# Patient Record
Sex: MALE | URBAN - METROPOLITAN AREA
[De-identification: names, ages, dates, MRNs, and addresses within clinical notes are randomized per-mention and may not be internally consistent; named-entity substitution may affect disease eponyms.]

---

## 2020-05-27 ENCOUNTER — APPOINTMENT (RX ONLY)
Dept: URBAN - METROPOLITAN AREA CLINIC 22 | Facility: CLINIC | Age: 45
Setting detail: DERMATOLOGY
End: 2020-05-27

## 2020-05-27 DIAGNOSIS — L81.4 OTHER MELANIN HYPERPIGMENTATION: ICD-10-CM

## 2020-05-27 DIAGNOSIS — Z71.89 OTHER SPECIFIED COUNSELING: ICD-10-CM

## 2020-05-27 DIAGNOSIS — D22 MELANOCYTIC NEVI: ICD-10-CM

## 2020-05-27 DIAGNOSIS — D18.0 HEMANGIOMA: ICD-10-CM

## 2020-05-27 DIAGNOSIS — L82.1 OTHER SEBORRHEIC KERATOSIS: ICD-10-CM

## 2020-05-27 PROBLEM — D22.5 MELANOCYTIC NEVI OF TRUNK: Status: ACTIVE | Noted: 2020-05-27

## 2020-05-27 PROBLEM — D18.01 HEMANGIOMA OF SKIN AND SUBCUTANEOUS TISSUE: Status: ACTIVE | Noted: 2020-05-27

## 2020-05-27 PROCEDURE — 99203 OFFICE O/P NEW LOW 30 MIN: CPT

## 2020-05-27 PROCEDURE — ? COUNSELING

## 2020-05-27 ASSESSMENT — LOCATION ZONE DERM: LOCATION ZONE: TRUNK

## 2020-05-27 ASSESSMENT — LOCATION SIMPLE DESCRIPTION DERM
LOCATION SIMPLE: LEFT UPPER BACK
LOCATION SIMPLE: RIGHT LOWER BACK
LOCATION SIMPLE: ABDOMEN

## 2020-05-27 ASSESSMENT — LOCATION DETAILED DESCRIPTION DERM
LOCATION DETAILED: RIGHT SUPERIOR MEDIAL MIDBACK
LOCATION DETAILED: EPIGASTRIC SKIN
LOCATION DETAILED: LEFT SUPERIOR MEDIAL UPPER BACK

## 2021-03-24 ENCOUNTER — HOSPITAL ENCOUNTER (OUTPATIENT)
Dept: LAB | Facility: MEDICAL CENTER | Age: 46
End: 2021-03-24
Attending: ANESTHESIOLOGY
Payer: COMMERCIAL

## 2021-03-24 LAB
COVID ORDER STATUS COVID19: NORMAL
SARS-COV-2 RNA RESP QL NAA+PROBE: NOTDETECTED
SPECIMEN SOURCE: NORMAL

## 2021-03-24 PROCEDURE — U0005 INFEC AGEN DETEC AMPLI PROBE: HCPCS

## 2021-03-24 PROCEDURE — C9803 HOPD COVID-19 SPEC COLLECT: HCPCS

## 2021-03-24 PROCEDURE — U0003 INFECTIOUS AGENT DETECTION BY NUCLEIC ACID (DNA OR RNA); SEVERE ACUTE RESPIRATORY SYNDROME CORONAVIRUS 2 (SARS-COV-2) (CORONAVIRUS DISEASE [COVID-19]), AMPLIFIED PROBE TECHNIQUE, MAKING USE OF HIGH THROUGHPUT TECHNOLOGIES AS DESCRIBED BY CMS-2020-01-R: HCPCS

## 2022-02-16 ENCOUNTER — HOSPITAL ENCOUNTER (EMERGENCY)
Facility: MEDICAL CENTER | Age: 47
End: 2022-02-16
Attending: EMERGENCY MEDICINE
Payer: COMMERCIAL

## 2022-02-16 VITALS
TEMPERATURE: 98.9 F | RESPIRATION RATE: 16 BRPM | WEIGHT: 165 LBS | HEART RATE: 75 BPM | SYSTOLIC BLOOD PRESSURE: 117 MMHG | HEIGHT: 70 IN | DIASTOLIC BLOOD PRESSURE: 74 MMHG | BODY MASS INDEX: 23.62 KG/M2 | OXYGEN SATURATION: 95 %

## 2022-02-16 DIAGNOSIS — I47.10 SVT (SUPRAVENTRICULAR TACHYCARDIA) (HCC): ICD-10-CM

## 2022-02-16 LAB
ALBUMIN SERPL BCP-MCNC: 5 G/DL (ref 3.2–4.9)
ALBUMIN/GLOB SERPL: 2.2 G/DL
ALP SERPL-CCNC: 51 U/L (ref 30–99)
ALT SERPL-CCNC: 26 U/L (ref 2–50)
ANION GAP SERPL CALC-SCNC: 12 MMOL/L (ref 7–16)
AST SERPL-CCNC: 34 U/L (ref 12–45)
BASOPHILS # BLD AUTO: 0.5 % (ref 0–1.8)
BASOPHILS # BLD: 0.04 K/UL (ref 0–0.12)
BILIRUB SERPL-MCNC: 0.6 MG/DL (ref 0.1–1.5)
BUN SERPL-MCNC: 20 MG/DL (ref 8–22)
CALCIUM SERPL-MCNC: 9.3 MG/DL (ref 8.5–10.5)
CHLORIDE SERPL-SCNC: 107 MMOL/L (ref 96–112)
CO2 SERPL-SCNC: 23 MMOL/L (ref 20–33)
CREAT SERPL-MCNC: 1.33 MG/DL (ref 0.5–1.4)
EKG IMPRESSION: NORMAL
EOSINOPHIL # BLD AUTO: 0.06 K/UL (ref 0–0.51)
EOSINOPHIL NFR BLD: 0.7 % (ref 0–6.9)
ERYTHROCYTE [DISTWIDTH] IN BLOOD BY AUTOMATED COUNT: 38.3 FL (ref 35.9–50)
GLOBULIN SER CALC-MCNC: 2.3 G/DL (ref 1.9–3.5)
GLUCOSE SERPL-MCNC: 94 MG/DL (ref 65–99)
HCT VFR BLD AUTO: 45.7 % (ref 42–52)
HGB BLD-MCNC: 16.3 G/DL (ref 14–18)
IMM GRANULOCYTES # BLD AUTO: 0.03 K/UL (ref 0–0.11)
IMM GRANULOCYTES NFR BLD AUTO: 0.3 % (ref 0–0.9)
LYMPHOCYTES # BLD AUTO: 1.28 K/UL (ref 1–4.8)
LYMPHOCYTES NFR BLD: 14.6 % (ref 22–41)
MCH RBC QN AUTO: 32.2 PG (ref 27–33)
MCHC RBC AUTO-ENTMCNC: 35.7 G/DL (ref 33.7–35.3)
MCV RBC AUTO: 90.3 FL (ref 81.4–97.8)
MONOCYTES # BLD AUTO: 0.51 K/UL (ref 0–0.85)
MONOCYTES NFR BLD AUTO: 5.8 % (ref 0–13.4)
NEUTROPHILS # BLD AUTO: 6.85 K/UL (ref 1.82–7.42)
NEUTROPHILS NFR BLD: 78.1 % (ref 44–72)
NRBC # BLD AUTO: 0 K/UL
NRBC BLD-RTO: 0 /100 WBC
PLATELET # BLD AUTO: 239 K/UL (ref 164–446)
PMV BLD AUTO: 10.4 FL (ref 9–12.9)
POTASSIUM SERPL-SCNC: 4.5 MMOL/L (ref 3.6–5.5)
PROT SERPL-MCNC: 7.3 G/DL (ref 6–8.2)
RBC # BLD AUTO: 5.06 M/UL (ref 4.7–6.1)
SODIUM SERPL-SCNC: 142 MMOL/L (ref 135–145)
TSH SERPL DL<=0.005 MIU/L-ACNC: 2.18 UIU/ML (ref 0.38–5.33)
WBC # BLD AUTO: 8.8 K/UL (ref 4.8–10.8)

## 2022-02-16 PROCEDURE — 84443 ASSAY THYROID STIM HORMONE: CPT

## 2022-02-16 PROCEDURE — 93005 ELECTROCARDIOGRAM TRACING: CPT | Performed by: EMERGENCY MEDICINE

## 2022-02-16 PROCEDURE — 93005 ELECTROCARDIOGRAM TRACING: CPT

## 2022-02-16 PROCEDURE — 36415 COLL VENOUS BLD VENIPUNCTURE: CPT

## 2022-02-16 PROCEDURE — 85025 COMPLETE CBC W/AUTO DIFF WBC: CPT

## 2022-02-16 PROCEDURE — 80053 COMPREHEN METABOLIC PANEL: CPT

## 2022-02-16 PROCEDURE — 99283 EMERGENCY DEPT VISIT LOW MDM: CPT

## 2022-02-17 NOTE — ED PROVIDER NOTES
ED Provider Note    CHIEF COMPLAINT  Chief Complaint   Patient presents with   • Supraventricular Tachycardia (SVT)     Pt found to be in SVT pta. EMS states they started an IV to give adenosine and pt converted to SR on his own        HPI  Pipo Hinson is a 46 y.o. male who presents for evaluation of palpitations and an episode of SVT.  The patient apparently has a history of supraventricular tachycardia.  This was diagnosed when he lived in [place].  He had been seen by cardiologist.  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  He specifically denies any stimulant drug abuse extra caffeine or any other instigating factor.  He was working from home on a [employer] meeting and felt queasiness and his pulse was around 1 .  Paramedics arrived and confirmed what apparently was supraventricular tachycardia.  They are in the process of starting an IV and likely due to that stimulation the patient converted to sinus rhythm.  He is currently not on any medications.  No other symptoms reported.    REVIEW OF SYSTEMS  See HPI for further details.  No chest pain numbness tingling weakness fevers chills all other systems are negative.     PAST MEDICAL HISTORY  Past Medical History:   Diagnosis Date   • SVT (supraventricular tachycardia) (HCC)        FAMILY HISTORY  No history of sudden cardiac death    SOCIAL HISTORY  Social History     Socioeconomic History   • Marital status: Single   Tobacco Use   • Smoking status: Never Smoker   • Smokeless tobacco: Never Used   Vaping Use   • Vaping Use: Never used   Substance and Sexual Activity   • Alcohol use: Never   • Drug use: Never     Denies drug or alcohol abuse  SURGICAL HISTORY  No past surgical history on file.  No major surgeries  CURRENT MEDICATIONS  Home Medications     Reviewed by Malathi Rashid R.N. (Registered Nurse) on 02/16/22 at 1622  Med List Status: Complete   Medication Last  "Dose Status        Patient Osmany Taking any Medications                       ALLERGIES  No Known Allergies    PHYSICAL EXAM  VITAL SIGNS: /74   Pulse 75   Temp 37.2 °C (98.9 °F) (Temporal)   Resp 16   Ht 1.778 m (5' 10\")   Wt 74.8 kg (165 lb)   SpO2 95%   BMI 23.68 kg/m²       Constitutional: Well developed, Well nourished, No acute distress, Non-toxic appearance.   HENT: Normocephalic, Atraumatic, Bilateral external ears normal, Oropharynx moist, No oral exudates, Nose normal.   Eyes: PERRLA, EOMI, Conjunctiva normal, No discharge.   Neck: Normal range of motion, No tenderness, Supple, No stridor.   Cardiovascular: Normal heart rate, Normal rhythm, No murmurs, No rubs, No gallops.   Thorax & Lungs: Normal breath sounds, No respiratory distress, No wheezing, No chest tenderness.   Abdomen: Bowel sounds normal, Soft, No tenderness, No masses, No pulsatile masses.   Skin: Warm, Dry, No erythema, No rash.   Extremities: Intact distal pulses, No edema, No tenderness, No cyanosis, No clubbing.    Neurologic: Alert & oriented x 3, Normal motor function, Normal sensory function, No focal deficits noted.   Psychiatric: Anxious  Results for orders placed or performed during the hospital encounter of 02/16/22   CBC WITH DIFFERENTIAL   Result Value Ref Range    WBC 8.8 4.8 - 10.8 K/uL    RBC 5.06 4.70 - 6.10 M/uL    Hemoglobin 16.3 14.0 - 18.0 g/dL    Hematocrit 45.7 42.0 - 52.0 %    MCV 90.3 81.4 - 97.8 fL    MCH 32.2 27.0 - 33.0 pg    MCHC 35.7 (H) 33.7 - 35.3 g/dL    RDW 38.3 35.9 - 50.0 fL    Platelet Count 239 164 - 446 K/uL    MPV 10.4 9.0 - 12.9 fL    Neutrophils-Polys 78.10 (H) 44.00 - 72.00 %    Lymphocytes 14.60 (L) 22.00 - 41.00 %    Monocytes 5.80 0.00 - 13.40 %    Eosinophils 0.70 0.00 - 6.90 %    Basophils 0.50 0.00 - 1.80 %    Immature Granulocytes 0.30 0.00 - 0.90 %    Nucleated RBC 0.00 /100 WBC    Neutrophils (Absolute) 6.85 1.82 - 7.42 K/uL    Lymphs (Absolute) 1.28 1.00 - 4.80 K/uL    Monos " (Absolute) 0.51 0.00 - 0.85 K/uL    Eos (Absolute) 0.06 0.00 - 0.51 K/uL    Baso (Absolute) 0.04 0.00 - 0.12 K/uL    Immature Granulocytes (abs) 0.03 0.00 - 0.11 K/uL    NRBC (Absolute) 0.00 K/uL   Comp Metabolic Panel   Result Value Ref Range    Sodium 142 135 - 145 mmol/L    Potassium 4.5 3.6 - 5.5 mmol/L    Chloride 107 96 - 112 mmol/L    Co2 23 20 - 33 mmol/L    Anion Gap 12.0 7.0 - 16.0    Glucose 94 65 - 99 mg/dL    Bun 20 8 - 22 mg/dL    Creatinine 1.33 0.50 - 1.40 mg/dL    Calcium 9.3 8.5 - 10.5 mg/dL    AST(SGOT) 34 12 - 45 U/L    ALT(SGPT) 26 2 - 50 U/L    Alkaline Phosphatase 51 30 - 99 U/L    Total Bilirubin 0.6 0.1 - 1.5 mg/dL    Albumin 5.0 (H) 3.2 - 4.9 g/dL    Total Protein 7.3 6.0 - 8.2 g/dL    Globulin 2.3 1.9 - 3.5 g/dL    A-G Ratio 2.2 g/dL   ESTIMATED GFR   Result Value Ref Range    GFR If African American >60 >60 mL/min/1.73 m 2    GFR If Non African American 58 (A) >60 mL/min/1.73 m 2   EKG (NOW)   Result Value Ref Range    Report       [facility] Emergency Dept.    Test Date:  2022  Pt Name:    MARCELINA MARTINEZ             Department: ER  MRN:        [id number]                      Room:  Gender:     Male                         Technician: [id number]  :        1975                   Requested By:ER TRIAGE PROTOCOL  Order #:    [id number]                    Reading MD:    Measurements  Intervals                                Axis  Rate:       68                           P:          69  RI:         188                          QRS:        -31  QRSD:       92                           T:          26  QT:         364  QTc:        388    Interpretive Statements  SINUS RHYTHM  LEFT AXIS DEVIATION  No previous ECG available for comparison           COURSE & MEDICAL DECISION MAKING  Pertinent Labs & Imaging studies reviewed. (See chart for details)  The patient presented here after an episode of supraventricular tachycardia in the field.  He spontaneously converted  without any obvious vagal maneuvers or adenosine.  Further history taking reveals that patient has been dealing this for quite a while.  He had seen a cardiologist in [place].  He was observed for around 2 hours and did not have any recurrence of tachydysrhythmia.  Laboratory studies including CBC metabolic panel are unremarkable.  He never had any chest pain or strokelike symptoms.  I added on thyroid studies for follow-up to cardiology which they can assess.  Patient declined low-dose beta-blocker therapy due to possible side effects I think that that is reasonable at this time.  We will provide urgent referral to cardiology to discuss medication and/or ablation    FINAL IMPRESSION  1.  Supraventricular tachycardia         Electronically signed by: Swapnil Niño M.D., 2/16/2022 4:38 PM

## 2022-02-17 NOTE — ED TRIAGE NOTES
Pt to triage .  Chief Complaint   Patient presents with   • Supraventricular Tachycardia (SVT)     Pt found to be in SVT pta. EMS states they started an IV to give adenosine and pt converted to SR on his own

## 2022-02-18 ENCOUNTER — TELEPHONE (OUTPATIENT)
Dept: CARDIOLOGY | Facility: MEDICAL CENTER | Age: 47
End: 2022-02-18
Payer: COMMERCIAL

## 2022-02-18 NOTE — TELEPHONE ENCOUNTER
"Called patient in regards to upcoming appointment scheduled on 02/24/2022 with . Per ER note, patient has been seen by a cardiologist in [place]. LVM for patient to please CB with this information to obtain records before his visit.     *Line went straight to voicemail, will try calling again at another time.\"     "

## 2022-02-18 NOTE — TELEPHONE ENCOUNTER
Medical records have been requested from [facility]: Rolan Pruitt's office. Fax: [phone], Phone: [phone].    Requested all cardiac related records, EKG tracings, OV notes, JACQU[facility]IN procedural notes.     Fax confirmation has been received and sent to scan through [employer].

## 2022-02-18 NOTE — TELEPHONE ENCOUNTER
Pt called back stating prior cardio Dr. Pruitt with [facility] in [place], [place]. He does have a CD that says TTE on it if that is needed.

## 2022-02-22 NOTE — TELEPHONE ENCOUNTER
Called [facility] to check status of medical records request. Stated fax number was incorrect and needs to be sent to another fax number: [phone], Phone: [phone].    Fax confirmation has been received and sent to scan through [employer].

## 2022-02-24 ENCOUNTER — OFFICE VISIT (OUTPATIENT)
Dept: CARDIOLOGY | Facility: MEDICAL CENTER | Age: 47
End: 2022-02-24
Attending: EMERGENCY MEDICINE
Payer: COMMERCIAL

## 2022-02-24 VITALS
RESPIRATION RATE: 13 BRPM | SYSTOLIC BLOOD PRESSURE: 118 MMHG | OXYGEN SATURATION: 99 % | WEIGHT: 166 LBS | HEART RATE: 41 BPM | HEIGHT: 70 IN | BODY MASS INDEX: 23.77 KG/M2 | DIASTOLIC BLOOD PRESSURE: 64 MMHG

## 2022-02-24 DIAGNOSIS — I47.10 SVT (SUPRAVENTRICULAR TACHYCARDIA) (HCC): ICD-10-CM

## 2022-02-24 LAB — EKG IMPRESSION: NORMAL

## 2022-02-24 PROCEDURE — 99205 OFFICE O/P NEW HI 60 MIN: CPT | Mod: 25 | Performed by: INTERNAL MEDICINE

## 2022-02-24 PROCEDURE — 93000 ELECTROCARDIOGRAM COMPLETE: CPT | Performed by: INTERNAL MEDICINE

## 2022-02-24 RX ORDER — PROPRANOLOL HYDROCHLORIDE 10 MG/1
10 TABLET ORAL 2 TIMES DAILY PRN
Qty: 60 TABLET | Refills: 0 | Status: SHIPPED | OUTPATIENT
Start: 2022-02-24 | End: 2023-01-13 | Stop reason: SDUPTHER

## 2022-02-24 ASSESSMENT — FIBROSIS 4 INDEX: FIB4 SCORE: 1.31

## 2022-02-24 NOTE — PROGRESS NOTES
Arrhythmia Clinic Note (New patient)     DOS: 2/24/2022    Referring physician: Dr Niño    Chief complaint/Reason for consult: SVT    HPI: 45 y/o M with longstanding history of pSVT. His mother has pAF. He has seen cardiology and EP before in [facility]. He has had episodes in the past of sustained SVT requiring adenosine for termination. Recently he had a similar sustained episode and EMS placed IV in the field and this converted pt to NSR. He is not on any medications due to low resting heart rate. He is anxious about catheter ablation and previously refused this.    ROS (+ highlighted in bold):  Constitutional: Fevers/chills/fatigue/weightloss  HEENT: Blurry vision/eye pain/sore throat/hearing loss  Respiratory: Shortness of breath/cough  Cardiovascular: Chest pain/palpitations/edema/orthopnea/syncope  GI: Nausea/vomitting/diarrhea  MSK: Arthralgias/myagias/muscle weakness  Skin: Rash/sores  Neurological: Numbness/tremors/vertigo  Endocrine: Excessive thirst/polyuria/cold intolerance/heat intolerance  Psych: Depression/anxiety    Past Medical History:   Diagnosis Date   • SVT (supraventricular tachycardia) (HCC)        No past surgical history on file.    Social History     Socioeconomic History   • Marital status: Single     Spouse name: Not on file   • Number of children: Not on file   • Years of education: Not on file   • Highest education level: Not on file   Occupational History   • Not on file   Tobacco Use   • Smoking status: Never Smoker   • Smokeless tobacco: Never Used   Vaping Use   • Vaping Use: Never used   Substance and Sexual Activity   • Alcohol use: Never   • Drug use: Never   • Sexual activity: Not on file   Other Topics Concern   • Not on file   Social History Narrative   • Not on file     Social Determinants of Health     Financial Resource Strain: Not on file   Food Insecurity: Not on file   Transportation Needs: Not on file   Physical Activity: Not on file   Stress: Not on file   Social  "Connections: Not on file   Intimate Partner Violence: Not on file   Housing Stability: Not on file       No family history on file.   Mother has PAF    No Known Allergies    Current Outpatient Medications   Medication Sig Dispense Refill   • propranolol (INDERAL) 10 MG Tab Take 1 Tablet by mouth 2 times a day as needed. 60 Tablet 0     No current facility-administered medications for this visit.       Physical Exam:  Vitals:    02/24/22 0739   BP: 118/64   BP Location: Left arm   Patient Position: Sitting   BP Cuff Size: Adult   Pulse: (!) 41   Resp: 13   SpO2: 99%   Weight: 75.3 kg (166 lb)   Height: 1.778 m (5' 10\")     General appearance: NAD, conversant   Eyes: anicteric sclerae, moist conjunctivae; no lid-lag; PERRLA  HENT: Atraumatic; oropharynx clear with moist mucous membranes and no mucosal ulcerations; normal hard and soft palate  Neck: Trachea midline; FROM, supple, no thyromegaly or lymphadenopathy  Lungs: CTA, with normal respiratory effort and no intercostal retractions  CV: RRR, no MRGs, no JVD   Abdomen: Soft, non-tender; no masses or HSM  Extremities: No peripheral edema or extremity lymphadenopathy  Skin: Normal temperature, turgor and texture; no rash, ulcers or subcutaneous nodules  Psych: Appropriate affect, alert and oriented to person, place and time    Data:  Lipids:   No results found for: CHOLSTRLTOT, TRIGLYCERIDE, HDL, LDL     BMP:  Lab Results   Component Value Date/Time    SODIUM 142 02/16/2022 1705    POTASSIUM 4.5 02/16/2022 1705    CHLORIDE 107 02/16/2022 1705    CO2 23 02/16/2022 1705    GLUCOSE 94 02/16/2022 1705    BUN 20 02/16/2022 1705    CREATININE 1.33 02/16/2022 1705    CALCIUM 9.3 02/16/2022 1705    ANION 12.0 02/16/2022 1705        TSH:   Lab Results   Component Value Date/Time    TSHULTRASEN 2.180 02/16/2022 1705        THYROXINE (T4):   No results found for: HSAMIKA     CBC:   Lab Results   Component Value Date/Time    WBC 8.8 02/16/2022 05:05 PM    RBC 5.06 02/16/2022 " 05:05 PM    HEMOGLOBIN 16.3 02/16/2022 05:05 PM    HEMATOCRIT 45.7 02/16/2022 05:05 PM    MCV 90.3 02/16/2022 05:05 PM    MCH 32.2 02/16/2022 05:05 PM    MCHC 35.7 (H) 02/16/2022 05:05 PM    RDW 38.3 02/16/2022 05:05 PM    PLATELETCT 239 02/16/2022 05:05 PM    MPV 10.4 02/16/2022 05:05 PM    NEUTSPOLYS 78.10 (H) 02/16/2022 05:05 PM    LYMPHOCYTES 14.60 (L) 02/16/2022 05:05 PM    MONOCYTES 5.80 02/16/2022 05:05 PM    EOSINOPHILS 0.70 02/16/2022 05:05 PM    BASOPHILS 0.50 02/16/2022 05:05 PM    IMMGRAN 0.30 02/16/2022 05:05 PM    NRBC 0.00 02/16/2022 05:05 PM    NEUTS 6.85 02/16/2022 05:05 PM    LYMPHS 1.28 02/16/2022 05:05 PM    MONOS 0.51 02/16/2022 05:05 PM    EOS 0.06 02/16/2022 05:05 PM    BASO 0.04 02/16/2022 05:05 PM    IMMGRANAB 0.03 02/16/2022 05:05 PM    NRBCAB 0.00 02/16/2022 05:05 PM        CBC w/o DIFF  Lab Results   Component Value Date/Time    WBC 8.8 02/16/2022 05:05 PM    RBC 5.06 02/16/2022 05:05 PM    HEMOGLOBIN 16.3 02/16/2022 05:05 PM    MCV 90.3 02/16/2022 05:05 PM    MCH 32.2 02/16/2022 05:05 PM    MCHC 35.7 (H) 02/16/2022 05:05 PM    RDW 38.3 02/16/2022 05:05 PM    MPV 10.4 02/16/2022 05:05 PM       Prior echo/stress results reviewed: Normal EF (records scanned into media)    EKG interpreted by me: Sinus wilma    Impression/Plan:  1. SVT (supraventricular tachycardia) (HCC)  EKG     1. pSVT    - We discussed treatment options including medication management vs EPS/RFA  - EPS/RFA was discussed in detail. Risks include vascular access bleeding or pain, infection, stroke, myocardial infarction, cardiac tamponade, pericardial effusion, myocardial perforation, major bleeding, phrenic nerve damage, heart block requiring a pacemaker, and death. Risk of major adverse event is ~1%. Success rates long term are 80-95% depending on the arrhythmia induced and the acute success in the EP lab.  - At this time he will think about the ablation and in the meantime wants Rx for PRN BB, writing for  propranolol    Schedule SVT ablation if/when pt calls    Aly Meraz MD  Cardiac Electrophysiology

## 2022-02-28 ENCOUNTER — TELEPHONE (OUTPATIENT)
Dept: CARDIOLOGY | Facility: MEDICAL CENTER | Age: 47
End: 2022-02-28
Payer: COMMERCIAL

## 2022-02-28 DIAGNOSIS — I47.10 SVT (SUPRAVENTRICULAR TACHYCARDIA) (HCC): ICD-10-CM

## 2022-02-28 NOTE — TELEPHONE ENCOUNTER
Dr. Meraz,    This patient would like to schedule the SVT ablation. Are there any medications you would like this patient to hold or this procedure?    Delaney - can you please enter the order for this procedure?    Thank You,  Viviana

## 2022-03-02 NOTE — TELEPHONE ENCOUNTER
Patient scheduled for SVT ablation on 4-22-22 with Dr. Meraz. Patient has been instructed to check in at 6:00 or 7:30 case time. Hold Propanolol 5 days before. Message sent to authorizations. Emailed Carto.

## 2022-03-04 NOTE — TELEPHONE ENCOUNTER
Recvd call from patient - requested to reschedule this procedure to 5-20-22. Patient now scheduled for 5-20-22 with Dr. Meraz. Patient has been instructed to check in at 6:00 for 7:30 case time. Hold Propanolol 5 days. Message sent to authorizations. Emailed Jessica.

## 2022-03-08 ENCOUNTER — HOSPITAL ENCOUNTER (OUTPATIENT)
Facility: MEDICAL CENTER | Age: 47
End: 2022-03-08
Attending: INTERNAL MEDICINE | Admitting: INTERNAL MEDICINE
Payer: COMMERCIAL

## 2022-04-14 NOTE — TELEPHONE ENCOUNTER
Recvd call from patient - he would like to hold off on this procedure at this time. Cancelled procedure per patient's request. He will call me back to reschedule if his episode before more frequent.    Cancelled case with Martha in cath lab scheduling and emailed Jessica.

## 2022-05-20 ENCOUNTER — APPOINTMENT (OUTPATIENT)
Dept: CARDIOLOGY | Facility: MEDICAL CENTER | Age: 47
End: 2022-05-20
Attending: INTERNAL MEDICINE
Payer: COMMERCIAL

## 2022-09-12 ENCOUNTER — APPOINTMENT (RX ONLY)
Dept: URBAN - METROPOLITAN AREA CLINIC 6 | Facility: CLINIC | Age: 47
Setting detail: DERMATOLOGY
End: 2022-09-12

## 2022-09-12 DIAGNOSIS — A63.0 ANOGENITAL (VENEREAL) WARTS: ICD-10-CM

## 2022-09-12 DIAGNOSIS — L30.0 NUMMULAR DERMATITIS: ICD-10-CM

## 2022-09-12 PROCEDURE — 99214 OFFICE O/P EST MOD 30 MIN: CPT

## 2022-09-12 PROCEDURE — ? COUNSELING

## 2022-09-12 PROCEDURE — ? MEDICATION COUNSELING

## 2022-09-12 PROCEDURE — ? PRESCRIPTION

## 2022-09-12 RX ORDER — PODOFILOX 5 MG/G
1 GEL TOPICAL
Qty: 3.5 | Refills: 3 | Status: ERX | COMMUNITY
Start: 2022-09-12

## 2022-09-12 RX ORDER — TRIAMCINOLONE ACETONIDE 1 MG/G
1 CREAM TOPICAL BID
Qty: 80 | Refills: 0 | Status: ERX | COMMUNITY
Start: 2022-09-12

## 2022-09-12 RX ADMIN — PODOFILOX 1: 5 GEL TOPICAL at 00:00

## 2022-09-12 RX ADMIN — TRIAMCINOLONE ACETONIDE 1: 1 CREAM TOPICAL at 00:00

## 2022-09-12 ASSESSMENT — LOCATION DETAILED DESCRIPTION DERM
LOCATION DETAILED: RIGHT PROXIMAL DORSAL FOREARM
LOCATION DETAILED: RIGHT VENTRAL PROXIMAL FOREARM
LOCATION DETAILED: LEFT DORSAL SHAFT OF PENIS

## 2022-09-12 ASSESSMENT — LOCATION SIMPLE DESCRIPTION DERM
LOCATION SIMPLE: RIGHT FOREARM
LOCATION SIMPLE: PENIS

## 2022-09-12 ASSESSMENT — LOCATION ZONE DERM
LOCATION ZONE: PENIS
LOCATION ZONE: ARM

## 2022-09-12 NOTE — PROCEDURE: MEDICATION COUNSELING
Cimzia Pregnancy And Lactation Text: This medication crosses the placenta but can be considered safe in certain situations. Cimzia may be excreted in breast milk.
Clofazimine Pregnancy And Lactation Text: This medication is Pregnancy Category C and isn't considered safe during pregnancy. It is excreted in breast milk.
Spironolactone Counseling: Patient advised regarding risks of diarrhea, abdominal pain, hyperkalemia, birth defects (for female patients), liver toxicity and renal toxicity. The patient may need blood work to monitor liver and kidney function and potassium levels while on therapy. The patient verbalized understanding of the proper use and possible adverse effects of spironolactone.  All of the patient's questions and concerns were addressed.
Methotrexate Pregnancy And Lactation Text: This medication is Pregnancy Category X and is known to cause fetal harm. This medication is excreted in breast milk.
Terbinafine Counseling: Patient counseling regarding adverse effects of terbinafine including but not limited to headache, diarrhea, rash, upset stomach, liver function test abnormalities, itching, taste/smell disturbance, nausea, abdominal pain, and flatulence.  There is a rare possibility of liver failure that can occur when taking terbinafine.  The patient understands that a baseline LFT and kidney function test may be required. The patient verbalized understanding of the proper use and possible adverse effects of terbinafine.  All of the patient's questions and concerns were addressed.
Tetracycline Pregnancy And Lactation Text: This medication is Pregnancy Category D and not consider safe during pregnancy. It is also excreted in breast milk.
Carac Counseling:  I discussed with the patient the risks of Carac including but not limited to erythema, scaling, itching, weeping, crusting, and pain.
Qbrexza Counseling:  I discussed with the patient the risks of Qbrexza including but not limited to headache, mydriasis, blurred vision, dry eyes, nasal dryness, dry mouth, dry throat, dry skin, urinary hesitation, and constipation.  Local skin reactions including erythema, burning, stinging, and itching can also occur.
Minocycline Counseling: Patient advised regarding possible photosensitivity and discoloration of the teeth, skin, lips, tongue and gums.  Patient instructed to avoid sunlight, if possible.  When exposed to sunlight, patients should wear protective clothing, sunglasses, and sunscreen.  The patient was instructed to call the office immediately if the following severe adverse effects occur:  hearing changes, easy bruising/bleeding, severe headache, or vision changes.  The patient verbalized understanding of the proper use and possible adverse effects of minocycline.  All of the patient's questions and concerns were addressed.
Winlevi Pregnancy And Lactation Text: This medication is considered safe during pregnancy and breastfeeding.
Minoxidil Pregnancy And Lactation Text: This medication has not been assigned a Pregnancy Risk Category but animal studies failed to show danger with the topical medication. It is unknown if the medication is excreted in breast milk.
Valtrex Pregnancy And Lactation Text: this medication is Pregnancy Category B and is considered safe during pregnancy. This medication is not directly found in breast milk but it's metabolite acyclovir is present.
Detail Level: Simple
Topical Clindamycin Counseling: Patient counseled that this medication may cause skin irritation or allergic reactions.  In the event of skin irritation, the patient was advised to reduce the amount of the drug applied or use it less frequently.   The patient verbalized understanding of the proper use and possible adverse effects of clindamycin.  All of the patient's questions and concerns were addressed.
Finasteride Pregnancy And Lactation Text: This medication is absolutely contraindicated during pregnancy. It is unknown if it is excreted in breast milk.
Eucrisa Counseling: Patient may experience a mild burning sensation during topical application. Eucrisa is not approved in children less than 2 years of age.
Cephalexin Pregnancy And Lactation Text: This medication is Pregnancy Category B and considered safe during pregnancy.  It is also excreted in breast milk but can be used safely for shorter doses.
Use Enhanced Medication Counseling?: No
High Dose Vitamin A Counseling: Side effects reviewed, pt to contact office should one occur.
Qbrexza Pregnancy And Lactation Text: There is no available data on Qbrexza use in pregnant women.  There is no available data on Qbrexza use in lactation.
Oral Minoxidil Pregnancy And Lactation Text: This medication should only be used when clearly needed if you are pregnant, attempting to become pregnant or breast feeding.
Stelara Pregnancy And Lactation Text: This medication is Pregnancy Category B and is considered safe during pregnancy. It is unknown if this medication is excreted in breast milk.
Azathioprine Counseling:  I discussed with the patient the risks of azathioprine including but not limited to myelosuppression, immunosuppression, hepatotoxicity, lymphoma, and infections.  The patient understands that monitoring is required including baseline LFTs, Creatinine, possible TPMP genotyping and weekly CBCs for the first month and then every 2 weeks thereafter.  The patient verbalized understanding of the proper use and possible adverse effects of azathioprine.  All of the patient's questions and concerns were addressed.
Albendazole Pregnancy And Lactation Text: This medication is Pregnancy Category C and it isn't known if it is safe during pregnancy. It is also excreted in breast milk.
Rituxan Counseling:  I discussed with the patient the risks of Rituxan infusions. Side effects can include infusion reactions, severe drug rashes including mucocutaneous reactions, reactivation of latent hepatitis and other infections and rarely progressive multifocal leukoencephalopathy.  All of the patient's questions and concerns were addressed.
Libtayo Counseling- I discussed with the patient the risks of Libtayo including but not limited to nausea, vomiting, diarrhea, and bone or muscle pain.  The patient verbalized understanding of the proper use and possible adverse effects of Libtayo.  All of the patient's questions and concerns were addressed.
Mirvaso Counseling: Mirvaso is a topical medication which can decrease superficial blood flow where applied. Side effects are uncommon and include stinging, redness and allergic reactions.
Carac Pregnancy And Lactation Text: This medication is Pregnancy Category X and contraindicated in pregnancy and in women who may become pregnant. It is unknown if this medication is excreted in breast milk.
Zyclara Counseling:  I discussed with the patient the risks of imiquimod including but not limited to erythema, scaling, itching, weeping, crusting, and pain.  Patient understands that the inflammatory response to imiquimod is variable from person to person and was educated regarded proper titration schedule.  If flu-like symptoms develop, patient knows to discontinue the medication and contact us.
Terbinafine Pregnancy And Lactation Text: This medication is Pregnancy Category B and is considered safe during pregnancy. It is also excreted in breast milk and breast feeding isn't recommended.
Prednisone Counseling:  I discussed with the patient the risks of prolonged use of prednisone including but not limited to weight gain, insomnia, osteoporosis, mood changes, diabetes, susceptibility to infection, glaucoma and high blood pressure.  In cases where prednisone use is prolonged, patients should be monitored with blood pressure checks, serum glucose levels and an eye exam.  Additionally, the patient may need to be placed on GI prophylaxis, PCP prophylaxis, and calcium and vitamin D supplementation and/or a bisphosphonate.  The patient verbalized understanding of the proper use and the possible adverse effects of prednisone.  All of the patient's questions and concerns were addressed.
Cosentyx Counseling:  I discussed with the patient the risks of Cosentyx including but not limited to worsening of Crohn's disease, immunosuppression, allergic reactions and infections.  The patient understands that monitoring is required including a PPD at baseline and must alert us or the primary physician if symptoms of infection or other concerning signs are noted.
High Dose Vitamin A Pregnancy And Lactation Text: High dose vitamin A therapy is contraindicated during pregnancy and breast feeding.
Otezla Counseling: The side effects of Otezla were discussed with the patient, including but not limited to worsening or new depression, weight loss, diarrhea, nausea, upper respiratory tract infection, and headache. Patient instructed to call the office should any adverse effect occur.  The patient verbalized understanding of the proper use and possible adverse effects of Otezla.  All the patient's questions and concerns were addressed.
Taltz Counseling: I discussed with the patient the risks of ixekizumab including but not limited to immunosuppression, serious infections, worsening of inflammatory bowel disease and drug reactions.  The patient understands that monitoring is required including a PPD at baseline and must alert us or the primary physician if symptoms of infection or other concerning signs are noted.
Colchicine Counseling:  Patient counseled regarding adverse effects including but not limited to stomach upset (nausea, vomiting, stomach pain, or diarrhea).  Patient instructed to limit alcohol consumption while taking this medication.  Colchicine may reduce blood counts especially with prolonged use.  The patient understands that monitoring of kidney function and blood counts may be required, especially at baseline. The patient verbalized understanding of the proper use and possible adverse effects of colchicine.  All of the patient's questions and concerns were addressed.
Spironolactone Pregnancy And Lactation Text: This medication can cause feminization of the male fetus and should be avoided during pregnancy. The active metabolite is also found in breast milk.
Clindamycin Counseling: I counseled the patient regarding use of clindamycin as an antibiotic for prophylactic and/or therapeutic purposes. Clindamycin is active against numerous classes of bacteria, including skin bacteria. Side effects may include nausea, diarrhea, gastrointestinal upset, rash, hives, yeast infections, and in rare cases, colitis.
Fluconazole Counseling:  Patient counseled regarding adverse effects of fluconazole including but not limited to headache, diarrhea, nausea, upset stomach, liver function test abnormalities, taste disturbance, and stomach pain.  There is a rare possibility of liver failure that can occur when taking fluconazole.  The patient understands that monitoring of LFTs and kidney function test may be required, especially at baseline. The patient verbalized understanding of the proper use and possible adverse effects of fluconazole.  All of the patient's questions and concerns were addressed.
Ivermectin Counseling:  Patient instructed to take medication on an empty stomach with a full glass of water.  Patient informed of potential adverse effects including but not limited to nausea, diarrhea, dizziness, itching, and swelling of the extremities or lymph nodes.  The patient verbalized understanding of the proper use and possible adverse effects of ivermectin.  All of the patient's questions and concerns were addressed.
Rituxan Pregnancy And Lactation Text: This medication is Pregnancy Category C and it isn't know if it is safe during pregnancy. It is unknown if this medication is excreted in breast milk but similar antibodies are known to be excreted.
Libtayo Pregnancy And Lactation Text: This medication is contraindicated in pregnancy and when breast feeding.
SSKI Counseling:  I discussed with the patient the risks of SSKI including but not limited to thyroid abnormalities, metallic taste, GI upset, fever, headache, acne, arthralgias, paraesthesias, lymphadenopathy, easy bleeding, arrhythmias, and allergic reaction.
Topical Clindamycin Pregnancy And Lactation Text: This medication is Pregnancy Category B and is considered safe during pregnancy. It is unknown if it is excreted in breast milk.
Gabapentin Counseling: I discussed with the patient the risks of gabapentin including but not limited to dizziness, somnolence, fatigue and ataxia.
Azathioprine Pregnancy And Lactation Text: This medication is Pregnancy Category D and isn't considered safe during pregnancy. It is unknown if this medication is excreted in breast milk.
Ilumya Counseling: I discussed with the patient the risks of tildrakizumab including but not limited to immunosuppression, malignancy, posterior leukoencephalopathy syndrome, and serious infections.  The patient understands that monitoring is required including a PPD at baseline and must alert us or the primary physician if symptoms of infection or other concerning signs are noted.
Rhofade Counseling: Rhofade is a topical medication which can decrease superficial blood flow where applied. Side effects are uncommon and include stinging, redness and allergic reactions.
Zyclara Pregnancy And Lactation Text: This medication is Pregnancy Category C. It is unknown if this medication is excreted in breast milk.
Otezla Pregnancy And Lactation Text: This medication is Pregnancy Category C and it isn't known if it is safe during pregnancy. It is unknown if it is excreted in breast milk.
Taltz Pregnancy And Lactation Text: The risk during pregnancy and breastfeeding is uncertain with this medication.
Clindamycin Pregnancy And Lactation Text: This medication can be used in pregnancy if certain situations. Clindamycin is also present in breast milk.
Fluconazole Pregnancy And Lactation Text: This medication is Pregnancy Category C and it isn't know if it is safe during pregnancy. It is also excreted in breast milk.
Cellcept Counseling:  I discussed with the patient the risks of mycophenolate mofetil including but not limited to infection/immunosuppression, GI upset, hypokalemia, hypercholesterolemia, bone marrow suppression, lymphoproliferative disorders, malignancy, GI ulceration/bleed/perforation, colitis, interstitial lung disease, kidney failure, progressive multifocal leukoencephalopathy, and birth defects.  The patient understands that monitoring is required including a baseline creatinine and regular CBC testing. In addition, patient must alert us immediately if symptoms of infection or other concerning signs are noted.
Quinolones Counseling:  I discussed with the patient the risks of fluoroquinolones including but not limited to GI upset, allergic reaction, drug rash, diarrhea, dizziness, photosensitivity, yeast infections, liver function test abnormalities, tendonitis/tendon rupture.
Mirvaso Pregnancy And Lactation Text: This medication has not been assigned a Pregnancy Risk Category. It is unknown if the medication is excreted in breast milk.
Siliq Counseling:  I discussed with the patient the risks of Siliq including but not limited to new or worsening depression, suicidal thoughts and behavior, immunosuppression, malignancy, posterior leukoencephalopathy syndrome, and serious infections.  The patient understands that monitoring is required including a PPD at baseline and must alert us or the primary physician if symptoms of infection or other concerning signs are noted. There is also a special program designed to monitor depression which is required with Siliq.
Hydroquinone Counseling:  Patient advised that medication may result in skin irritation, lightening (hypopigmentation), dryness, and burning.  In the event of skin irritation, the patient was advised to reduce the amount of the drug applied or use it less frequently.  Rarely, spots that are treated with hydroquinone can become darker (pseudoochronosis).  Should this occur, patient instructed to stop medication and call the office. The patient verbalized understanding of the proper use and possible adverse effects of hydroquinone.  All of the patient's questions and concerns were addressed.
Topical Ketoconazole Counseling: Patient counseled that this medication may cause skin irritation or allergic reactions.  In the event of skin irritation, the patient was advised to reduce the amount of the drug applied or use it less frequently.   The patient verbalized understanding of the proper use and possible adverse effects of ketoconazole.  All of the patient's questions and concerns were addressed.
Cimetidine Counseling:  I discussed with the patient the risks of Cimetidine including but not limited to gynecomastia, headache, diarrhea, nausea, drowsiness, arrhythmias, pancreatitis, skin rashes, psychosis, bone marrow suppression and kidney toxicity.
Calcipotriene Counseling:  I discussed with the patient the risks of calcipotriene including but not limited to erythema, scaling, itching, and irritation.
Prednisone Pregnancy And Lactation Text: This medication is Pregnancy Category C and it isn't know if it is safe during pregnancy. This medication is excreted in breast milk.
Dupixent Counseling: I discussed with the patient the risks of dupilumab including but not limited to eye infection and irritation, cold sores, injection site reactions, worsening of asthma, allergic reactions and increased risk of parasitic infection.  Live vaccines should be avoided while taking dupilumab. Dupilumab will also interact with certain medications such as warfarin and cyclosporine. The patient understands that monitoring is required and they must alert us or the primary physician if symptoms of infection or other concerning signs are noted.
Dapsone Counseling: I discussed with the patient the risks of dapsone including but not limited to hemolytic anemia, agranulocytosis, rashes, methemoglobinemia, kidney failure, peripheral neuropathy, headaches, GI upset, and liver toxicity.  Patients who start dapsone require monitoring including baseline LFTs and weekly CBCs for the first month, then every month thereafter.  The patient verbalized understanding of the proper use and possible adverse effects of dapsone.  All of the patient's questions and concerns were addressed.
Calcipotriene Pregnancy And Lactation Text: This medication has not been proven safe during pregnancy. It is unknown if this medication is excreted in breast milk.
Niacinamide Counseling: I recommended taking niacin or niacinamide, also know as vitamin B3, twice daily. Recent evidence suggests that taking vitamin B3 (500 mg twice daily) can reduce the risk of actinic keratoses and non-melanoma skin cancers. Side effects of vitamin B3 include flushing and headache.
Sski Pregnancy And Lactation Text: This medication is Pregnancy Category D and isn't considered safe during pregnancy. It is excreted in breast milk.
Opzelura Counseling:  I discussed with the patient the risks of Opzelura including but not limited to nasopharngitis, bronchitis, ear infection, eosinophila, hives, diarrhea, folliculitis, tonsillitis, and rhinorrhea.  Taken orally, this medication has been linked to serious infections; higher rate of mortality; malignancy and lymphoproliferative disorders; major adverse cardiovascular events; thrombosis; thrombocytopenia, anemia, and neutropenia; and lipid elevations.
Aklief counseling:  Patient advised to apply a pea-sized amount only at bedtime and wait 30 minutes after washing their face before applying.  If too drying, patient may add a non-comedogenic moisturizer.  The most commonly reported side effects including irritation, redness, scaling, dryness, stinging, burning, itching, and increased risk of sunburn.  The patient verbalized understanding of the proper use and possible adverse effects of retinoids.  All of the patient's questions and concerns were addressed.
Griseofulvin Counseling:  I discussed with the patient the risks of griseofulvin including but not limited to photosensitivity, cytopenia, liver damage, nausea/vomiting and severe allergy.  The patient understands that this medication is best absorbed when taken with a fatty meal (e.g., ice cream or french fries).
Acitretin Counseling:  I discussed with the patient the risks of acitretin including but not limited to hair loss, dry lips/skin/eyes, liver damage, hyperlipidemia, depression/suicidal ideation, photosensitivity.  Serious rare side effects can include but are not limited to pancreatitis, pseudotumor cerebri, bony changes, clot formation/stroke/heart attack.  Patient understands that alcohol is contraindicated since it can result in liver toxicity and significantly prolong the elimination of the drug by many years.
Opioid Counseling: I discussed with the patient the potential side effects of opioids including but not limited to addiction, altered mental status, and depression. I stressed avoiding alcohol, benzodiazepines, muscle relaxants and sleep aids unless specifically okayed by a physician. The patient verbalized understanding of the proper use and possible adverse effects of opioids. All of the patient's questions and concerns were addressed. They were instructed to flush the remaining pills down the toilet if they did not need them for pain.
Niacinamide Pregnancy And Lactation Text: These medications are considered safe during pregnancy.
Oxybutynin Counseling:  I discussed with the patient the risks of oxybutynin including but not limited to skin rash, drowsiness, dry mouth, difficulty urinating, and blurred vision.
Tremfya Counseling: I discussed with the patient the risks of guselkumab including but not limited to immunosuppression, serious infections, and drug reactions.  The patient understands that monitoring is required including a PPD at baseline and must alert us or the primary physician if symptoms of infection or other concerning signs are noted.
Glycopyrrolate Counseling:  I discussed with the patient the risks of glycopyrrolate including but not limited to skin rash, drowsiness, dry mouth, difficulty urinating, and blurred vision.
Doxycycline Counseling:  Patient counseled regarding possible photosensitivity and increased risk for sunburn.  Patient instructed to avoid sunlight, if possible.  When exposed to sunlight, patients should wear protective clothing, sunglasses, and sunscreen.  The patient was instructed to call the office immediately if the following severe adverse effects occur:  hearing changes, easy bruising/bleeding, severe headache, or vision changes.  The patient verbalized understanding of the proper use and possible adverse effects of doxycycline.  All of the patient's questions and concerns were addressed.
Thalidomide Counseling: I discussed with the patient the risks of thalidomide including but not limited to birth defects, anxiety, weakness, chest pain, dizziness, cough and severe allergy.
Rifampin Counseling: I discussed with the patient the risks of rifampin including but not limited to liver damage, kidney damage, red-orange body fluids, nausea/vomiting and severe allergy.
Opzelura Pregnancy And Lactation Text: There is insufficient data to evaluate drug-associated risk for major birth defects, miscarriage, or other adverse maternal or fetal outcomes.  There is a pregnancy registry that monitors pregnancy outcomes in pregnant persons exposed to the medication during pregnancy.  It is unknown if this medication is excreted in breast milk.  Do not breastfeed during treatment and for about 4 weeks after the last dose.
Infliximab Counseling:  I discussed with the patient the risks of infliximab including but not limited to myelosuppression, immunosuppression, autoimmune hepatitis, demyelinating diseases, lymphoma, and serious infections.  The patient understands that monitoring is required including a PPD at baseline and must alert us or the primary physician if symptoms of infection or other concerning signs are noted.
Solaraze Counseling:  I discussed with the patient the risks of Solaraze including but not limited to erythema, scaling, itching, weeping, crusting, and pain.
Dupixent Pregnancy And Lactation Text: This medication likely crosses the placenta but the risk for the fetus is uncertain. This medication is excreted in breast milk.
Dapsone Pregnancy And Lactation Text: This medication is Pregnancy Category C and is not considered safe during pregnancy or breast feeding.
Griseofulvin Pregnancy And Lactation Text: This medication is Pregnancy Category X and is known to cause serious birth defects. It is unknown if this medication is excreted in breast milk but breast feeding should be avoided.
Adbry Counseling: I discussed with the patient the risks of tralokinumab including but not limited to eye infection and irritation, cold sores, injection site reactions, worsening of asthma, allergic reactions and increased risk of parasitic infection.  Live vaccines should be avoided while taking tralokinumab. The patient understands that monitoring is required and they must alert us or the primary physician if symptoms of infection or other concerning signs are noted.
Acitretin Pregnancy And Lactation Text: This medication is Pregnancy Category X and should not be given to women who are pregnant or may become pregnant in the future. This medication is excreted in breast milk.
Simponi Counseling:  I discussed with the patient the risks of golimumab including but not limited to myelosuppression, immunosuppression, autoimmune hepatitis, demyelinating diseases, lymphoma, and serious infections.  The patient understands that monitoring is required including a PPD at baseline and must alert us or the primary physician if symptoms of infection or other concerning signs are noted.
Glycopyrrolate Pregnancy And Lactation Text: This medication is Pregnancy Category B and is considered safe during pregnancy. It is unknown if it is excreted breast milk.
Azithromycin Counseling:  I discussed with the patient the risks of azithromycin including but not limited to GI upset, allergic reaction, drug rash, diarrhea, and yeast infections.
Imiquimod Counseling:  I discussed with the patient the risks of imiquimod including but not limited to erythema, scaling, itching, weeping, crusting, and pain.  Patient understands that the inflammatory response to imiquimod is variable from person to person and was educated regarded proper titration schedule.  If flu-like symptoms develop, patient knows to discontinue the medication and contact us.
Doxycycline Pregnancy And Lactation Text: This medication is Pregnancy Category D and not consider safe during pregnancy. It is also excreted in breast milk but is considered safe for shorter treatment courses.
Topical Sulfur Applications Counseling: Topical Sulfur Counseling: Patient counseled that this medication may cause skin irritation or allergic reactions.  In the event of skin irritation, the patient was advised to reduce the amount of the drug applied or use it less frequently.   The patient verbalized understanding of the proper use and possible adverse effects of topical sulfur application.  All of the patient's questions and concerns were addressed.
Doxepin Counseling:  Patient advised that the medication is sedating and not to drive a car after taking this medication. Patient informed of potential adverse effects including but not limited to dry mouth, urinary retention, and blurry vision.  The patient verbalized understanding of the proper use and possible adverse effects of doxepin.  All of the patient's questions and concerns were addressed.
Solaraze Pregnancy And Lactation Text: This medication is Pregnancy Category B and is considered safe. There is some data to suggest avoiding during the third trimester. It is unknown if this medication is excreted in breast milk.
Enbrel Counseling:  I discussed with the patient the risks of etanercept including but not limited to myelosuppression, immunosuppression, autoimmune hepatitis, demyelinating diseases, lymphoma, and infections.  The patient understands that monitoring is required including a PPD at baseline and must alert us or the primary physician if symptoms of infection or other concerning signs are noted.
Aklief Pregnancy And Lactation Text: It is unknown if this medication is safe to use during pregnancy.  It is unknown if this medication is excreted in breast milk.  Breastfeeding women should use the topical cream on the smallest area of the skin for the shortest time needed while breastfeeding.  Do not apply to nipple and areola.
Dutasteride Male Counseling: Dustasteride Counseling:  I discussed with the patient the risks of use of dutasteride including but not limited to decreased libido, decreased ejaculate volume, and gynecomastia. Women who can become pregnant should not handle medication.  All of the patient's questions and concerns were addressed.
Cyclophosphamide Counseling:  I discussed with the patient the risks of cyclophosphamide including but not limited to hair loss, hormonal abnormalities, decreased fertility, abdominal pain, diarrhea, nausea and vomiting, bone marrow suppression and infection. The patient understands that monitoring is required while taking this medication.
Opioid Pregnancy And Lactation Text: These medications can lead to premature delivery and should be avoided during pregnancy. These medications are also present in breast milk in small amounts.
Nsaids Counseling: NSAID Counseling: I discussed with the patient that NSAIDs should be taken with food. Prolonged use of NSAIDs can result in the development of stomach ulcers.  Patient advised to stop taking NSAIDs if abdominal pain occurs.  The patient verbalized understanding of the proper use and possible adverse effects of NSAIDs.  All of the patient's questions and concerns were addressed.
Adbry Pregnancy And Lactation Text: It is unknown if this medication will adversely affect pregnancy or breast feeding.
Xeljanz Counseling: I discussed with the patient the risks of Xeljanz therapy including increased risk of infection, liver issues, headache, diarrhea, or cold symptoms. Live vaccines should be avoided. They were instructed to call if they have any problems.
Picato Counseling:  I discussed with the patient the risks of Picato including but not limited to erythema, scaling, itching, weeping, crusting, and pain.
Rifampin Pregnancy And Lactation Text: This medication is Pregnancy Category C and it isn't know if it is safe during pregnancy. It is also excreted in breast milk and should not be used if you are breast feeding.
Azelaic Acid Counseling: Patient counseled that medicine may cause skin irritation and to avoid applying near the eyes.  In the event of skin irritation, the patient was advised to reduce the amount of the drug applied or use it less frequently.   The patient verbalized understanding of the proper use and possible adverse effects of azelaic acid.  All of the patient's questions and concerns were addressed.
Thalidomide Pregnancy And Lactation Text: This medication is Pregnancy Category X and is absolutely contraindicated during pregnancy. It is unknown if it is excreted in breast milk.
Doxepin Pregnancy And Lactation Text: This medication is Pregnancy Category C and it isn't known if it is safe during pregnancy. It is also excreted in breast milk and breast feeding isn't recommended.
Cantharidin Pregnancy And Lactation Text: The use of this medication during pregnancy or lactation is not recommended as there is insufficient data.
Erythromycin Counseling:  I discussed with the patient the risks of erythromycin including but not limited to GI upset, allergic reaction, drug rash, diarrhea, increase in liver enzymes, and yeast infections.
Itraconazole Counseling:  I discussed with the patient the risks of itraconazole including but not limited to liver damage, nausea/vomiting, neuropathy, and severe allergy.  The patient understands that this medication is best absorbed when taken with acidic beverages such as non-diet cola or ginger ale.  The patient understands that monitoring is required including baseline LFTs and repeat LFTs at intervals.  The patient understands that they are to contact us or the primary physician if concerning signs are noted.
Propranolol Counseling:  I discussed with the patient the risks of propranolol including but not limited to low heart rate, low blood pressure, low blood sugar, restlessness and increased cold sensitivity. They should call the office if they experience any of these side effects.
Topical Retinoid counseling:  Patient advised to apply a pea-sized amount only at bedtime and wait 30 minutes after washing their face before applying.  If too drying, patient may add a non-comedogenic moisturizer. The patient verbalized understanding of the proper use and possible adverse effects of retinoids.  All of the patient's questions and concerns were addressed.
Nsaids Pregnancy And Lactation Text: These medications are considered safe up to 30 weeks gestation. It is excreted in breast milk.
Topical Sulfur Applications Pregnancy And Lactation Text: This medication is Pregnancy Category C and has an unknown safety profile during pregnancy. It is unknown if this topical medication is excreted in breast milk.
Tranexamic Acid Counseling:  Patient advised of the small risk of bleeding problems with tranexamic acid. They were also instructed to call if they developed any nausea, vomiting or diarrhea. All of the patient's questions and concerns were addressed.
Cyclophosphamide Pregnancy And Lactation Text: This medication is Pregnancy Category D and it isn't considered safe during pregnancy. This medication is excreted in breast milk.
Olumiant Counseling: I discussed with the patient the risks of Olumiant therapy including but not limited to upper respiratory tract infections, shingles, cold sores, and nausea. Live vaccines should be avoided.  This medication has been linked to serious infections; higher rate of mortality; malignancy and lymphoproliferative disorders; major adverse cardiovascular events; thrombosis; gastrointestinal perforations; neutropenia; lymphopenia; anemia; liver enzyme elevations; and lipid elevations.
Bexarotene Counseling:  I discussed with the patient the risks of bexarotene including but not limited to hair loss, dry lips/skin/eyes, liver abnormalities, hyperlipidemia, pancreatitis, depression/suicidal ideation, photosensitivity, drug rash/allergic reactions, hypothyroidism, anemia, leukopenia, infection, cataracts, and teratogenicity.  Patient understands that they will need regular blood tests to check lipid profile, liver function tests, white blood cell count, thyroid function tests and pregnancy test if applicable.
Dutasteride Pregnancy And Lactation Text: This medication is absolutely contraindicated in women, especially during pregnancy and breast feeding. Feminization of male fetuses is possible if taking while pregnant.
Azithromycin Pregnancy And Lactation Text: This medication is considered safe during pregnancy and is also secreted in breast milk.
Propranolol Pregnancy And Lactation Text: This medication is Pregnancy Category C and it isn't known if it is safe during pregnancy. It is excreted in breast milk.
Azelaic Acid Pregnancy And Lactation Text: This medication is considered safe during pregnancy and breast feeding.
Klisyri Counseling:  I discussed with the patient the risks of Klisyri including but not limited to erythema, scaling, itching, weeping, crusting, and pain.
Erythromycin Pregnancy And Lactation Text: This medication is Pregnancy Category B and is considered safe during pregnancy. It is also excreted in breast milk.
Cyclosporine Counseling:  I discussed with the patient the risks of cyclosporine including but not limited to hypertension, gingival hyperplasia,myelosuppression, immunosuppression, liver damage, kidney damage, neurotoxicity, lymphoma, and serious infections. The patient understands that monitoring is required including baseline blood pressure, CBC, CMP, lipid panel and uric acid, and then 1-2 times monthly CMP and blood pressure.
Sarecycline Counseling: Patient advised regarding possible photosensitivity and discoloration of the teeth, skin, lips, tongue and gums.  Patient instructed to avoid sunlight, if possible.  When exposed to sunlight, patients should wear protective clothing, sunglasses, and sunscreen.  The patient was instructed to call the office immediately if the following severe adverse effects occur:  hearing changes, easy bruising/bleeding, severe headache, or vision changes.  The patient verbalized understanding of the proper use and possible adverse effects of sarecycline.  All of the patient's questions and concerns were addressed.
Odomzo Counseling- I discussed with the patient the risks of Odomzo including but not limited to nausea, vomiting, diarrhea, constipation, weight loss, changes in the sense of taste, decreased appetite, muscle spasms, and hair loss.  The patient verbalized understanding of the proper use and possible adverse effects of Odomzo.  All of the patient's questions and concerns were addressed.
Bexarotene Pregnancy And Lactation Text: This medication is Pregnancy Category X and should not be given to women who are pregnant or may become pregnant. This medication should not be used if you are breast feeding.
Skyrizi Counseling: I discussed with the patient the risks of risankizumab-rzaa including but not limited to immunosuppression, and serious infections.  The patient understands that monitoring is required including a PPD at baseline and must alert us or the primary physician if symptoms of infection or other concerning signs are noted.
Arava Counseling:  Patient counseled regarding adverse effects of Arava including but not limited to nausea, vomiting, abnormalities in liver function tests. Patients may develop mouth sores, rash, diarrhea, and abnormalities in blood counts. The patient understands that monitoring is required including LFTs and blood counts.  There is a rare possibility of scarring of the liver and lung problems that can occur when taking methotrexate. Persistent nausea, loss of appetite, pale stools, dark urine, cough, and shortness of breath should be reported immediately. Patient advised to discontinue Arava treatment and consult with a physician prior to attempting conception. The patient will have to undergo a treatment to eliminate Arava from the body prior to conception.
Cibinqo Counseling: I discussed with the patient the risks of Cibinqo therapy including but not limited to common cold, nausea, headache, cold sores, increased blood CPK levels, dizziness, UTIs, fatigue, acne, and vomitting. Live vaccines should be avoided.  This medication has been linked to serious infections; higher rate of mortality; malignancy and lymphoproliferative disorders; major adverse cardiovascular events; thrombosis; thrombocytopenia and lymphopenia; lipid elevations; and retinal detachment.
Hydroxyzine Counseling: Patient advised that the medication is sedating and not to drive a car after taking this medication.  Patient informed of potential adverse effects including but not limited to dry mouth, urinary retention, and blurry vision.  The patient verbalized understanding of the proper use and possible adverse effects of hydroxyzine.  All of the patient's questions and concerns were addressed.
5-Fu Counseling: 5-Fluorouracil Counseling:  I discussed with the patient the risks of 5-fluorouracil including but not limited to erythema, scaling, itching, weeping, crusting, and pain.
Xelfátimaz Pregnancy And Lactation Text: This medication is Pregnancy Category D and is not considered safe during pregnancy.  The risk during breast feeding is also uncertain.
Wartpeel Counseling:  I discussed with the patient the risks of Wartpeel including but not limited to erythema, scaling, itching, weeping, crusting, and pain.
Humira Counseling:  I discussed with the patient the risks of adalimumab including but not limited to myelosuppression, immunosuppression, autoimmune hepatitis, demyelinating diseases, lymphoma, and serious infections.  The patient understands that monitoring is required including a PPD at baseline and must alert us or the primary physician if symptoms of infection or other concerning signs are noted.
Erivedge Counseling- I discussed with the patient the risks of Erivedge including but not limited to nausea, vomiting, diarrhea, constipation, weight loss, changes in the sense of taste, decreased appetite, muscle spasms, and hair loss.  The patient verbalized understanding of the proper use and possible adverse effects of Erivedge.  All of the patient's questions and concerns were addressed.
Bactrim Counseling:  I discussed with the patient the risks of sulfa antibiotics including but not limited to GI upset, allergic reaction, drug rash, diarrhea, dizziness, photosensitivity, and yeast infections.  Rarely, more serious reactions can occur including but not limited to aplastic anemia, agranulocytosis, methemoglobinemia, blood dyscrasias, liver or kidney failure, lung infiltrates or desquamative/blistering drug rashes.
Ketoconazole Counseling:   Patient counseled regarding improving absorption with orange juice.  Adverse effects include but are not limited to breast enlargement, headache, diarrhea, nausea, upset stomach, liver function test abnormalities, taste disturbance, and stomach pain.  There is a rare possibility of liver failure that can occur when taking ketoconazole. The patient understands that monitoring of LFTs may be required, especially at baseline. The patient verbalized understanding of the proper use and possible adverse effects of ketoconazole.  All of the patient's questions and concerns were addressed.
Protopic Counseling: Patient may experience a mild burning sensation during topical application. Protopic is not approved in children less than 2 years of age. There have been case reports of hematologic and skin malignancies in patients using topical calcineurin inhibitors although causality is questionable.
Cibinqo Pregnancy And Lactation Text: It is unknown if this medication will adversely affect pregnancy or breast feeding.  You should not take this medication if you are currently pregnant or planning a pregnancy or while breastfeeding.
Olumiant Pregnancy And Lactation Text: Based on animal studies, Olumiant may cause embryo-fetal harm when administered to pregnant women.  The medication should not be used in pregnancy.  Breastfeeding is not recommended during treatment.
Tranexamic Acid Pregnancy And Lactation Text: It is unknown if this medication is safe during pregnancy or breast feeding.
Hydroxyzine Pregnancy And Lactation Text: This medication is not safe during pregnancy and should not be taken. It is also excreted in breast milk and breast feeding isn't recommended.
Metronidazole Counseling:  I discussed with the patient the risks of metronidazole including but not limited to seizures, nausea/vomiting, a metallic taste in the mouth, nausea/vomiting and severe allergy.
Isotretinoin Counseling: Patient should get monthly blood tests, not donate blood, not drive at night if vision affected, not share medication, and not undergo elective surgery for 6 months after tx completed. Side effects reviewed, pt to contact office should one occur.
Birth Control Pills Counseling: Birth Control Pill Counseling: I discussed with the patient the potential side effects of OCPs including but not limited to increased risk of stroke, heart attack, thrombophlebitis, deep venous thrombosis, hepatic adenomas, breast changes, GI upset, headaches, and depression.  The patient verbalized understanding of the proper use and possible adverse effects of OCPs. All of the patient's questions and concerns were addressed.
Elidel Counseling: Patient may experience a mild burning sensation during topical application. Elidel is not approved in children less than 2 years of age. There have been case reports of hematologic and skin malignancies in patients using topical calcineurin inhibitors although causality is questionable.
Benzoyl Peroxide Counseling: Patient counseled that medicine may cause skin irritation and bleach clothing.  In the event of skin irritation, the patient was advised to reduce the amount of the drug applied or use it less frequently.   The patient verbalized understanding of the proper use and possible adverse effects of benzoyl peroxide.  All of the patient's questions and concerns were addressed.
Bactrim Pregnancy And Lactation Text: This medication is Pregnancy Category D and is known to cause fetal risk.  It is also excreted in breast milk.
Xolair Counseling:  Patient informed of potential adverse effects including but not limited to fever, muscle aches, rash and allergic reactions.  The patient verbalized understanding of the proper use and possible adverse effects of Xolair.  All of the patient's questions and concerns were addressed.
Klisyri Pregnancy And Lactation Text: It is unknown if this medication can harm a developing fetus or if it is excreted in breast milk.
Tetracycline Counseling: Patient counseled regarding possible photosensitivity and increased risk for sunburn.  Patient instructed to avoid sunlight, if possible.  When exposed to sunlight, patients should wear protective clothing, sunglasses, and sunscreen.  The patient was instructed to call the office immediately if the following severe adverse effects occur:  hearing changes, easy bruising/bleeding, severe headache, or vision changes.  The patient verbalized understanding of the proper use and possible adverse effects of tetracycline.  All of the patient's questions and concerns were addressed. Patient understands to avoid pregnancy while on therapy due to potential birth defects.
Drysol Counseling:  I discussed with the patient the risks of drysol/aluminum chloride including but not limited to skin rash, itching, irritation, burning.
Cimzia Counseling:  I discussed with the patient the risks of Cimzia including but not limited to immunosuppression, allergic reactions and infections.  The patient understands that monitoring is required including a PPD at baseline and must alert us or the primary physician if symptoms of infection or other concerning signs are noted.
Protopic Pregnancy And Lactation Text: This medication is Pregnancy Category C. It is unknown if this medication is excreted in breast milk when applied topically.
Rinvoq Counseling: I discussed with the patient the risks of Rinvoq therapy including but not limited to upper respiratory tract infections, shingles, cold sores, bronchitis, nausea, cough, fever, acne, and headache. Live vaccines should be avoided.  This medication has been linked to serious infections; higher rate of mortality; malignancy and lymphoproliferative disorders; major adverse cardiovascular events; thrombosis; thrombocytopenia, anemia, and neutropenia; lipid elevations; liver enzyme elevations; and gastrointestinal perforations.
Benzoyl Peroxide Pregnancy And Lactation Text: This medication is Pregnancy Category C. It is unknown if benzoyl peroxide is excreted in breast milk.
Tazorac Counseling:  Patient advised that medication is irritating and drying.  Patient may need to apply sparingly and wash off after an hour before eventually leaving it on overnight.  The patient verbalized understanding of the proper use and possible adverse effects of tazorac.  All of the patient's questions and concerns were addressed.
Hydroxychloroquine Counseling:  I discussed with the patient that a baseline ophthalmologic exam is needed at the start of therapy and every year thereafter while on therapy. A CBC may also be warranted for monitoring.  The side effects of this medication were discussed with the patient, including but not limited to agranulocytosis, aplastic anemia, seizures, rashes, retinopathy, and liver toxicity. Patient instructed to call the office should any adverse effect occur.  The patient verbalized understanding of the proper use and possible adverse effects of Plaquenil.  All the patient's questions and concerns were addressed.
Birth Control Pills Pregnancy And Lactation Text: This medication should be avoided if pregnant and for the first 30 days post-partum.
Xolair Pregnancy And Lactation Text: This medication is Pregnancy Category B and is considered safe during pregnancy. This medication is excreted in breast milk.
Valtrex Counseling: I discussed with the patient the risks of valacyclovir including but not limited to kidney damage, nausea, vomiting and severe allergy.  The patient understands that if the infection seems to be worsening or is not improving, they are to call.
Metronidazole Pregnancy And Lactation Text: This medication is Pregnancy Category B and considered safe during pregnancy.  It is also excreted in breast milk.
Clofazimine Counseling:  I discussed with the patient the risks of clofazimine including but not limited to skin and eye pigmentation, liver damage, nausea/vomiting, gastrointestinal bleeding and allergy.
Ketoconazole Pregnancy And Lactation Text: This medication is Pregnancy Category C and it isn't know if it is safe during pregnancy. It is also excreted in breast milk and breast feeding isn't recommended.
Isotretinoin Pregnancy And Lactation Text: This medication is Pregnancy Category X and is considered extremely dangerous during pregnancy. It is unknown if it is excreted in breast milk.
Oral Minoxidil Counseling- I discussed with the patient the risks of oral minoxidil including but not limited to shortness of breath, swelling of the feet or ankles, dizziness, lightheadedness, unwanted hair growth and allergic reaction.  The patient verbalized understanding of the proper use and possible adverse effects of oral minoxidil.  All of the patient's questions and concerns were addressed.
Minoxidil Counseling: Minoxidil is a topical medication which can increase blood flow where it is applied. It is uncertain how this medication increases hair growth. Side effects are uncommon and include stinging and allergic reactions.
Winlevi Counseling:  I discussed with the patient the risks of topical clascoterone including but not limited to erythema, scaling, itching, and stinging. Patient voiced their understanding.
Albendazole Counseling:  I discussed with the patient the risks of albendazole including but not limited to cytopenia, kidney damage, nausea/vomiting and severe allergy.  The patient understands that this medication is being used in an off-label manner.
Methotrexate Counseling:  Patient counseled regarding adverse effects of methotrexate including but not limited to nausea, vomiting, abnormalities in liver function tests. Patients may develop mouth sores, rash, diarrhea, and abnormalities in blood counts. The patient understands that monitoring is required including LFT's and blood counts.  There is a rare possibility of scarring of the liver and lung problems that can occur when taking methotrexate. Persistent nausea, loss of appetite, pale stools, dark urine, cough, and shortness of breath should be reported immediately. Patient advised to discontinue methotrexate treatment at least three months before attempting to become pregnant.  I discussed the need for folate supplements while taking methotrexate.  These supplements can decrease side effects during methotrexate treatment. The patient verbalized understanding of the proper use and possible adverse effects of methotrexate.  All of the patient's questions and concerns were addressed.
Rinvoq Pregnancy And Lactation Text: Based on animal studies, Rinvoq may cause embryo-fetal harm when administered to pregnant women.  The medication should not be used in pregnancy.  Breastfeeding is not recommended during treatment and for 6 days after the last dose.
Tazorac Pregnancy And Lactation Text: This medication is not safe during pregnancy. It is unknown if this medication is excreted in breast milk.
Hydroxychloroquine Pregnancy And Lactation Text: This medication has been shown to cause fetal harm but it isn't assigned a Pregnancy Risk Category. There are small amounts excreted in breast milk.
Stelara Counseling:  I discussed with the patient the risks of ustekinumab including but not limited to immunosuppression, malignancy, posterior leukoencephalopathy syndrome, and serious infections.  The patient understands that monitoring is required including a PPD at baseline and must alert us or the primary physician if symptoms of infection or other concerning signs are noted.
Finasteride Male Counseling: Finasteride Counseling:  I discussed with the patient the risks of use of finasteride including but not limited to decreased libido, decreased ejaculate volume, gynecomastia, and depression. Women should not handle medication.  All of the patient's questions and concerns were addressed.
Cephalexin Counseling: I counseled the patient regarding use of cephalexin as an antibiotic for prophylactic and/or therapeutic purposes. Cephalexin (commonly prescribed under brand name Keflex) is a cephalosporin antibiotic which is active against numerous classes of bacteria, including most skin bacteria. Side effects may include nausea, diarrhea, gastrointestinal upset, rash, hives, yeast infections, and in rare cases, hepatitis, kidney disease, seizures, fever, confusion, neurologic symptoms, and others. Patients with severe allergies to penicillin medications are cautioned that there is about a 10% incidence of cross-reactivity with cephalosporins. When possible, patients with penicillin allergies should use alternatives to cephalosporins for antibiotic therapy.

## 2022-09-12 NOTE — HPI: RASH
What Type Of Note Output Would You Prefer (Optional)?: Bullet Format
How Severe Is Your Rash?: mild
Is This A New Presentation, Or A Follow-Up?: Rash
Additional History: Patient has been using TAC and OTC Econazole

## 2023-01-13 ENCOUNTER — TELEMEDICINE (OUTPATIENT)
Dept: CARDIOLOGY | Facility: MEDICAL CENTER | Age: 48
End: 2023-01-13
Payer: COMMERCIAL

## 2023-01-13 VITALS — WEIGHT: 166 LBS | HEIGHT: 70 IN | BODY MASS INDEX: 23.77 KG/M2

## 2023-01-13 DIAGNOSIS — Z91.89 OTHER SPECIFIED PERSONAL RISK FACTORS, NOT ELSEWHERE CLASSIFIED: ICD-10-CM

## 2023-01-13 DIAGNOSIS — E78.00 PURE HYPERCHOLESTEROLEMIA: ICD-10-CM

## 2023-01-13 DIAGNOSIS — I47.10 SVT (SUPRAVENTRICULAR TACHYCARDIA) (HCC): ICD-10-CM

## 2023-01-13 PROBLEM — E78.5 HYPERLIPIDEMIA: Status: ACTIVE | Noted: 2017-05-25

## 2023-01-13 PROBLEM — J30.2 SEASONAL ALLERGIES: Status: ACTIVE | Noted: 2023-01-13

## 2023-01-13 PROCEDURE — 99214 OFFICE O/P EST MOD 30 MIN: CPT | Mod: 95 | Performed by: NURSE PRACTITIONER

## 2023-01-13 RX ORDER — PROPRANOLOL HYDROCHLORIDE 10 MG/1
10 TABLET ORAL 2 TIMES DAILY PRN
Qty: 60 TABLET | Refills: 2 | Status: SHIPPED | OUTPATIENT
Start: 2023-01-13

## 2023-01-13 ASSESSMENT — FIBROSIS 4 INDEX: FIB4 SCORE: 1.34

## 2023-01-13 NOTE — PROGRESS NOTES
"Virtual Visit: Established Patient   This visit was conducted via Zoom using secure and encrypted videoconferencing technology.   The patient was in their home in the [facility].    The patient's identity was confirmed and verbal consent was obtained for this virtual visit.     Subjective:   CC: SVT and HLD    Pipo Hinson III is a 47 y.o. male, very physically active with healthy lifestyle, presenting for evaluation and management of SVT and recent lab work showing elevated LDL.    Was scheduled last year with EPS and SVT ablation with Dr. Meraz however was hesitant about undergoing procedure, hoping that lifestyle changes will help decrease his SVT episodes.  However, last year he had at least 3 episodes which seems to be the most he has ever had.  Usually not lasting more than 30 minutes takes propanolol at the onset of symptoms and is usually able to use vagal maneuvers to convert out of SVT  Had recent lab work total cholesterol 234, , HDL 41, concerned about these numbers, hesitant to start a statin medication given possible long-term side effects from this.  No significant family history of CAD,   Denies substance abuse, rarely uses alcohol.  ROS   He denies palpitations, chest pain, shortness of breath, dyspnea on exertion, dizziness or syncopal episodes, orthopnea, PND, lower extremity swelling, and recent weight gain.     Current medicines (including changes today)  Current Outpatient Medications   Medication Sig Dispense Refill    propranolol (INDERAL) 10 MG Tab Take 1 Tablet by mouth 2 times a day as needed (for SVT episode). 60 Tablet 2     No current facility-administered medications for this visit.       There are no problems to display for this patient.       Objective:   Ht 1.778 m (5' 10\")   Wt 75.3 kg (166 lb)   BMI 23.82 kg/m²     Physical Exam:  Constitutional: Alert, no distress, well-groomed.  Skin: No rashes in visible areas.  Eye: Round. Conjunctiva clear, lids " normal. No icterus.   ENMT: Lips pink without lesions, good dentition, moist mucous membranes. Phonation normal.  Neck: No masses, no thyromegaly. Moves freely without pain.  Respiratory: Unlabored respiratory effort, no cough or audible wheeze  Psych: Alert and oriented x3, normal affect and mood.     Assessment and Plan:   The following treatment plan was discussed:     1. Other specified personal risk factors, not elsewhere classified  - CT-CARDIAC SCORING    2. Pure hypercholesterolemia  - CT-CARDIAC SCORING   - ASCVD 3%, no statin recommended until > 5%  - Referral to Vascular Medicine, for further discussion on possible need for statin therapy other labs to identify cardiovascular risk given is elevated LDL of 186 despite very health diet and lifestyle    3. SVT (supraventricular tachycardia) (HCC)  - propranolol (INDERAL) 10 MG Tab; Take 1 Tablet by mouth 2 times a day as needed (for SVT episode).  Dispense: 60 Tablet; Refill: 2  - Continue propanolol prn, consider EPS with ablation if symptoms continue to increase    Follow-up: Return in about 1 year (around 1/13/2024). With Dr. Meraz

## 2023-01-18 ENCOUNTER — DOCUMENTATION (OUTPATIENT)
Dept: VASCULAR LAB | Facility: MEDICAL CENTER | Age: 48
End: 2023-01-18
Payer: COMMERCIAL

## 2023-01-18 NOTE — PROGRESS NOTES
Left message for patient to call back and schedule new patient appt with Dr. Fang. Next available ok.

## 2023-01-25 ENCOUNTER — DOCUMENTATION (OUTPATIENT)
Dept: VASCULAR LAB | Facility: MEDICAL CENTER | Age: 48
End: 2023-01-25
Payer: COMMERCIAL

## 2023-02-01 ENCOUNTER — DOCUMENTATION (OUTPATIENT)
Dept: VASCULAR LAB | Facility: MEDICAL CENTER | Age: 48
End: 2023-02-01
Payer: COMMERCIAL

## 2023-02-02 NOTE — PROGRESS NOTES
Patient referred to vascular care  Unfortunately our schedulers have been unable to reach patient despite multiple attempts  Unless patient establishes with a face-to-face visit in our office will be unable to take part in care  Await further patient contact.  Pending further contact, we will defer all further management of vascular disease and its risk factors to PCP and/or referring MD.    Michael J. Bloch, MD  Vascular Care    cc:     DAMARIS Messer

## 2023-02-03 ENCOUNTER — HOSPITAL ENCOUNTER (OUTPATIENT)
Dept: RADIOLOGY | Facility: MEDICAL CENTER | Age: 48
End: 2023-02-03
Attending: NURSE PRACTITIONER
Payer: COMMERCIAL

## 2023-02-03 PROCEDURE — 4410556 CT-CARDIAC SCORING (SELF PAY ONLY)

## 2023-04-14 ENCOUNTER — OFFICE VISIT (OUTPATIENT)
Dept: VASCULAR LAB | Facility: MEDICAL CENTER | Age: 48
End: 2023-04-14
Attending: FAMILY MEDICINE
Payer: COMMERCIAL

## 2023-04-14 VITALS
WEIGHT: 165 LBS | HEART RATE: 61 BPM | SYSTOLIC BLOOD PRESSURE: 126 MMHG | HEIGHT: 70 IN | BODY MASS INDEX: 23.62 KG/M2 | DIASTOLIC BLOOD PRESSURE: 72 MMHG

## 2023-04-14 DIAGNOSIS — E78.00 PRIMARY HYPERCHOLESTEROLEMIA: ICD-10-CM

## 2023-04-14 DIAGNOSIS — I47.10 PSVT (PAROXYSMAL SUPRAVENTRICULAR TACHYCARDIA) (HCC): ICD-10-CM

## 2023-04-14 DIAGNOSIS — R93.1 AGATSTON CORONARY ARTERY CALCIUM SCORE LESS THAN 100: ICD-10-CM

## 2023-04-14 DIAGNOSIS — I25.10 CORONARY ARTERY CALCIFICATION SEEN ON CT SCAN: ICD-10-CM

## 2023-04-14 PROCEDURE — 99212 OFFICE O/P EST SF 10 MIN: CPT

## 2023-04-14 PROCEDURE — 99204 OFFICE O/P NEW MOD 45 MIN: CPT | Performed by: FAMILY MEDICINE

## 2023-04-14 RX ORDER — CHLORAL HYDRATE 500 MG
1000 CAPSULE ORAL
COMMUNITY

## 2023-04-14 ASSESSMENT — ENCOUNTER SYMPTOMS
SHORTNESS OF BREATH: 0
CHILLS: 0
COUGH: 0
PALPITATIONS: 0
SPUTUM PRODUCTION: 0
HEMOPTYSIS: 0
ORTHOPNEA: 0
WHEEZING: 0
CLAUDICATION: 0
FEVER: 0
PND: 0

## 2023-04-14 ASSESSMENT — FIBROSIS 4 INDEX: FIB4 SCORE: 1.34

## 2023-04-14 NOTE — PROGRESS NOTES
[facility] - Initial Visit  04/14/23     Pipo Hinson III has been referred for evaluation and management of dyslipidemia    Referral Source: Anna Salazar, A.PLIA.*     Subjective   Initial hx:  recent labs with  and has been hesitant to start med mgmt citing concerns for long-term side effects    Change in weight: Stable  BMI Readings from Last 5 Encounters:   04/14/23 23.68 kg/m²   01/13/23 23.82 kg/m²   02/24/22 23.82 kg/m²   02/16/22 23.68 kg/m²     Exercise habits: strenuous regular exercise program  Dietary patterns: high fruit/veg, limit red meat, healthy   Etoh: No  Reported barriers to care: none   History of ASCVD: None  Secondary causes of dyslipidemia:  Endocrine/Hypothyroidism:  none reported   Liver disease: none reported   Renal disease/nephrotic syndrome:  none reported  Medications: None  Other Established (non-atherosclerotic) Vascular Disease, if Present: yehs    # SVT, seeing cardiology, possibly having ablation     Age at Initial Diagnosis of Dyslipidemia: 47    Current Prescription Lipid Lowering Medications - including dose:   Statin: None  Non-Statin: None  Current Lipid Lowering and Related Supplements: None  Any Current Side Effects Potentially Related to Lipid Lowering therapy? No  Current Adherence to Lipid Lowering Therapies: Complete  Previously Attempted Interventions for Lipids - including outcome  Statin: None   Outcome: N/A  Non-Statin: None  Outcome: N/A  Any Previous History of Statin Intolerance? No  Baseline Lipids Prior to Treatment:   1/2023: total cholesterol 234, , HDL 41    Anticoagulation/antiplats: No, no hx of bleeding    HTN: no dx, meds      T2D: No     PAST MEDICAL HISTORY:  has a past medical history of SVT (supraventricular tachycardia) (HCC).    PAST SURGICAL HISTORY:  has no past surgical history on file.      Current Outpatient Medications:     MAGNESIUM PO, Take  by mouth., Taking    fish oil, 1,000 mg, Oral, TID WITH  "MEALS, Taking    VITAMIN D PO, Take  by mouth., Taking    CREATINE PO, Take  by mouth., Taking    Plant Sterols and Stanols, 1 g, Oral, BID    Berberine Complex, 500 mg, Oral, TID    propranolol, 10 mg, Oral, BID PRN, PRN    ALLERGIES: Patient has no known allergies.    History reviewed. No pertinent family history.    Social History     Tobacco Use    Smoking status: Never    Smokeless tobacco: Never   Vaping Use    Vaping Use: Never used   Substance Use Topics    Alcohol use: Never    Drug use: Never       Review of Systems   Constitutional:  Negative for chills, fever and malaise/fatigue.   Respiratory:  Negative for cough, hemoptysis, sputum production, shortness of breath and wheezing.    Cardiovascular:  Negative for chest pain, palpitations, orthopnea, claudication, leg swelling and PND.       Objective    Vitals:    04/14/23 0921   BP: 126/72   BP Location: Left arm   Patient Position: Sitting   BP Cuff Size: Adult   Pulse: 61   Weight: 74.8 kg (165 lb)   Height: 1.778 m (5' 10\")      BP Readings from Last 5 Encounters:   04/14/23 126/72   02/24/22 118/64   02/16/22 117/74     Physical Exam  Constitutional:       Appearance: Normal appearance. He is well-developed.   HENT:      Head: Normocephalic and atraumatic.   Eyes:      Conjunctiva/sclera: Conjunctivae normal.      Pupils: Pupils are equal, round, and reactive to light.      Comments: No corneal arcus   Neck:      Thyroid: No thyromegaly.      Vascular: No JVD.   Cardiovascular:      Rate and Rhythm: Normal rate and regular rhythm.      Pulses:           Radial pulses are 2+ on the right side and 2+ on the left side.        Dorsalis pedis pulses are 2+ on the right side and 2+ on the left side.        Posterior tibial pulses are 2+ on the right side and 2+ on the left side.      Heart sounds: Normal heart sounds. No murmur heard.    No friction rub. No gallop.   Pulmonary:      Effort: Pulmonary effort is normal. No respiratory distress.      Breath " sounds: Normal breath sounds.   Musculoskeletal:      Cervical back: Normal range of motion and neck supple.      Comments: No achilles tendon thickening  No extensor surface xanthomas at dorsal hands, patellae, achilles   Lymphadenopathy:      Cervical: No cervical adenopathy.   Skin:     General: Skin is warm and dry.      Comments: No periocular xanthelasma   Neurological:      General: No focal deficit present.      Mental Status: He is alert and oriented to person, place, and time.      Cranial Nerves: No cranial nerve deficit.       DATA REVIEW:  Most Recent Lipid Panel:   [facility] 22    HDL 41    TG 67    [facility] 3/30/23      HDL 37  TG 62  hsCRP 0.71     No results found for: LIPOPROTA   No results found for: APOB      Other Pertinent Blood Work:   Lab Results   Component Value Date    SODIUM 142 2022    POTASSIUM 4.5 2022    CHLORIDE 107 2022    CO2 23 2022    ANION 12.0 2022    GLUCOSE 94 2022    BUN 20 2022    CREATININE 1.33 2022    CALCIUM 9.3 2022    ASTSGOT 34 2022    ALTSGPT 26 2022    ALKPHOSPHAT 51 2022    TBILIRUBIN 0.6 2022    ALBUMIN 5.0 (H) 2022    AGRATIO 2.2 2022    TSHULTRASEN 2.180 2022     VASCULAR IMAGING:     Results for orders placed or performed in visit on 22   EKG   Result Value Ref Range    Report       [facility]    Test Date:  2022  Pt Name:    MARCELINA MARTINEZ             Department: [facility]  MRN:        [id number]                      Room:  Gender:     Male                         Technician: CLEMENTINE  :        1975                   Requested By:ALY MERAZ  Order #:    [id number]                    Reading MD: Aly Meraz MD    Measurements  Intervals                                Axis  Rate:       41                           P:          40  KY:         162                          QRS:        32  QRSD:       104        "                   T:          59  QT:         447  QTc:        370    Interpretive Statements  Sinus bradycardia  Compared to ECG 02/16/2022 16:25:54  NO CHANGES COMPARED TO PRIOR ECG  Electronically Signed On 2- 14:33:59 PST by Aly Meraz MD       CACS 2/2023   Coronary calcification:  LMA - 0.0  LCX - 0.0  LAD - 81.5  RCA - 0.0  PDA - 0.0   Total Calcium Score: 81.5   Percentile: Calcium score is above the 75th percentile for the patient's age and sex.   Other findings:  Heart: Normal size.  Lungs: Clear.  Mediastinum: Normal.  Upper abdomen: Normal.          ASSESSMENT AND PLAN  1. Primary hypercholesterolemia  Lipid Profile    Lipoprotein (a)    CRP HIGH SENSITIVE (CARDIAC)    Plant Sterols and Stanols 450 MG Cap    Barberry-Oreg Grape-Goldenseal (BERBERINE COMPLEX) 200-200-50 MG Cap    baseline LDL = 182      2. PSVT (paroxysmal supraventricular tachycardia) (HCC)        3. Agatston coronary artery calcium score less than 100        4. Coronary artery calcification seen on CT scan          Patient Type, check all that apply: Primary Prevention    Established Atherosclerotic Cardiovascular Disease (ASCVD): yes    1) subclinical CAD, evidenced by CACS = 81 (90%ile for age/gender)  Low short-term, higher long-term risk based upon %ile   CACS 1-99 favors statin in male >55  FRANCIS risk score w/ CAC = 5.6 %  FRANCIS risk score w/o CAC = 3 %  FRANCIS CAC percentile for age/gender = 90 %     Other Established (non-atherosclerotic) Vascular Disease, if Present: None    Evidence of Heterozygous Familial Hypercholesterolemia (FH): No   FH genotyping:  not recommended    ACC/AHA Indication for Statin Therapy, khris all that apply:  Primary Prevention - 40-76yo, LDLc >70, <190 w/o DM    Calculated Risk for ASCVD, if applicable    The ASCVD Risk score (Aleah DK, et al., 2019) failed to calculate., <5% \"low risk\"    Other Significant Risk Markers, if any, khris all that apply   None    Risk-enhancers: Persistently elevated " LDL-C >159 - improved with TLC     Goal LDL-C and nonHDL-C based on Clinic Protocol  reduce LDL-C 30-49% and LDL-C <100 (if HTG, consider apoB <90, non-HDL-C <130)  At goal? No, 3/2023     LIFESTYLE INTERVENTIONS (primary focus):    SMOKING:    reports that he has never smoked. He has never used smokeless tobacco.   - continued complete avoidance of all tobacco products     PHYSICAL ACTIVITY: continue healthy activity to improve CV fitness.  In general, targeting >150min/week of moderate-level activity or as much as tolerated in light of functional status and co-morbidities     WEIGHT MANAGEMENT AND NUTRITION: Mediterranean style dietary approach , Plant-based style dietary approach - increase or substitute nuts, beans, proteinaceous complex grains (eg. quinoe), and seeds in lieu of animal proteins 1 to 2 times weekly, and Provide specific dietary approach handouts      LIPID LOWERING MEDICATION MANAGEMENT:     Statin Therapy Recommendations from Today’s Visit:   - not definitively high risk enough for good magnitude of benefit at this time   - through shared MDM, we will hold statin for now, likely will need in future   - we did review statin ADRs and safety in detail     Non-Statin Medications Recommendations from Today’s Visit: none     Indication for PCSK9 Inhibitor strategy, if applicable: Not currently indicated    Supplements Recommended at this visit: - start plant sterols/stanols 1g two times daily with food and berberine 500mg 3 times daily with food (preferred to reduce GI upset)      RECOMMENDATIONS FOR OTHER CV RISK FACTORS:    1) BLOOD PRESSURE MANAGEMENT:  ACC/AHA (2017) goal <130/80  Home BP at goal: yes  Office BP at goal:  yes   Plan:   - continue healthy diet, activity, weight mgmt   Monitoring:   - routine clinic-based BP measurements at least once annually   Medications: no meds indicated at this time      2) Glycemic Status: Normal    ANTITHROMBOTIC THERAPY None    OTHER ISSUES: none      Studies Ordered at Todays Visit: CACS - consider 2026-28   Blood Work Ordered At Today’s visit: as noted above   Follow-Up: 6 months    Total time: 45min - chart review/prep, review of other providers' records, imaging/lab review, face-to-face time for history/examination, ordering, prescribing,  review of results/meds/ treatment plan with patient/family/caregiver, documentation in EMR, care coordination (as needed)     Morales Fang M.D.  [facility], Board-certified clinical lipidologist   [facility] Clinic   [facility]   [phone]     CC:  KATLYN Valel Malina J., A.P.R.*

## 2023-10-13 ENCOUNTER — APPOINTMENT (OUTPATIENT)
Dept: VASCULAR LAB | Facility: MEDICAL CENTER | Age: 48
End: 2023-10-13
Payer: COMMERCIAL

## 2023-11-07 LAB
CHOLEST SERPL-MCNC: 200 MG/DL (ref 100–199)
CRP SERPL HS-MCNC: 0.34 MG/L (ref 0–3)
HDLC SERPL-MCNC: 38 MG/DL
LABORATORY COMMENT REPORT: ABNORMAL
LDLC SERPL CALC-MCNC: 146 MG/DL (ref 0–99)
LPA SERPL-SCNC: <8.4 NMOL/L
TRIGL SERPL-MCNC: 87 MG/DL (ref 0–149)
VLDLC SERPL CALC-MCNC: 16 MG/DL (ref 5–40)

## 2023-11-17 ENCOUNTER — OFFICE VISIT (OUTPATIENT)
Dept: VASCULAR LAB | Facility: MEDICAL CENTER | Age: 48
End: 2023-11-17
Attending: FAMILY MEDICINE
Payer: COMMERCIAL

## 2023-11-17 VITALS
BODY MASS INDEX: 24.2 KG/M2 | DIASTOLIC BLOOD PRESSURE: 68 MMHG | SYSTOLIC BLOOD PRESSURE: 114 MMHG | WEIGHT: 169 LBS | HEART RATE: 43 BPM | HEIGHT: 70 IN

## 2023-11-17 DIAGNOSIS — I25.10 CORONARY ARTERY CALCIFICATION SEEN ON CT SCAN: ICD-10-CM

## 2023-11-17 DIAGNOSIS — E78.00 PRIMARY HYPERCHOLESTEROLEMIA: ICD-10-CM

## 2023-11-17 DIAGNOSIS — I47.10 PSVT (PAROXYSMAL SUPRAVENTRICULAR TACHYCARDIA) (HCC): ICD-10-CM

## 2023-11-17 PROCEDURE — 99214 OFFICE O/P EST MOD 30 MIN: CPT | Performed by: FAMILY MEDICINE

## 2023-11-17 PROCEDURE — 3078F DIAST BP <80 MM HG: CPT | Performed by: FAMILY MEDICINE

## 2023-11-17 PROCEDURE — 3074F SYST BP LT 130 MM HG: CPT | Performed by: FAMILY MEDICINE

## 2023-11-17 PROCEDURE — 99212 OFFICE O/P EST SF 10 MIN: CPT

## 2023-11-17 ASSESSMENT — ENCOUNTER SYMPTOMS
WHEEZING: 0
PALPITATIONS: 0
ORTHOPNEA: 0
FEVER: 0
COUGH: 0
SHORTNESS OF BREATH: 0
CHILLS: 0
SPUTUM PRODUCTION: 0
CLAUDICATION: 0
HEMOPTYSIS: 0
PND: 0

## 2023-11-17 ASSESSMENT — FIBROSIS 4 INDEX: FIB4 SCORE: 1.34

## 2023-11-17 NOTE — PROGRESS NOTES
[facility] - FOLLOW-UP  11/17/23     Pipo Hinson III has been referred for evaluation and management of dyslipidemia    Referral Source: Anna Salazar A.PAURELIO*     Subjective     Interval hx/concerns: last seen 5/2023,  80% adherent with supplements.  No health changes, had labs done.     LIFESTYLE MGMT:  Change in weight: Stable  Wt Readings from Last 3 Encounters:   11/17/23 76.7 kg (169 lb)   04/14/23 74.8 kg (165 lb)   01/13/23 75.3 kg (166 lb)    Exercise habits: strenuous regular exercise program  Dietary patterns: reduced red meat, no processed foods, yolks  Etoh: No  Reported barriers to care: none     MED MGMT:  Current Prescription Lipid Lowering Medications - including dose:   Statin: None  Non-Statin: None  Current Lipid Lowering and Related Supplements: None  Any Current Side Effects Potentially Related to Lipid Lowering therapy? No  Current Adherence to Lipid Lowering Therapies: Complete    PERTINENT HLD PMHX:  History of ASCVD: None  Secondary causes of dyslipidemia:  Endocrine/Hypothyroidism:  none reported   Liver disease: none reported   Renal disease/nephrotic syndrome:  none reported  Medications: None  Other Established (non-atherosclerotic) Vascular Disease, if Present:   # SVT, seeing cardiology, possibly having ablation     Age at Initial Diagnosis of Dyslipidemia: 47    Previously Attempted Interventions for Lipids - including outcome  Statin: None   Outcome: N/A  Non-Statin: None  Outcome: N/A  Any Previous History of Statin Intolerance? No  Baseline Lipids Prior to Treatment:   1/2023: total cholesterol 234, , HDL 41    Anticoagulation/antiplats: No, no hx of bleeding    HTN: no dx, meds        Current Outpatient Medications:     MAGNESIUM PO, Take  by mouth., Taking    fish oil, 1,000 mg, Oral, TID WITH MEALS, Taking    VITAMIN D PO, Take  by mouth., Taking    CREATINE PO, Take  by mouth., Taking    Plant Sterols and Stanols, 1 g, Oral, BID, Taking     "Berberine Complex, 500 mg, Oral, TID, Taking    propranolol, 10 mg, Oral, BID PRN, Taking    ALLERGIES: Patient has no known allergies.    Family History   Problem Relation Age of Onset    No Known Problems Mother     Hyperlipidemia Father         statin    No Known Problems Brother     Clotting Disorder Neg Hx     Stroke Neg Hx     Heart Attack Neg Hx        Social History     Tobacco Use    Smoking status: Never    Smokeless tobacco: Never   Vaping Use    Vaping Use: Never used   Substance Use Topics    Alcohol use: Never    Drug use: Never     Review of Systems   Constitutional:  Negative for chills, fever and malaise/fatigue.   Respiratory:  Negative for cough, hemoptysis, sputum production, shortness of breath and wheezing.    Cardiovascular:  Negative for chest pain, palpitations, orthopnea, claudication, leg swelling and PND.         Objective    Vitals:    11/17/23 0929   BP: 114/68   BP Location: Left arm   Patient Position: Sitting   BP Cuff Size: Adult   Pulse: (!) 43   Weight: 76.7 kg (169 lb)   Height: 1.778 m (5' 10\")      BP Readings from Last 5 Encounters:   11/17/23 114/68   04/14/23 126/72   02/24/22 118/64   02/16/22 117/74     Physical Exam  Constitutional:       Appearance: Normal appearance. He is well-developed.   HENT:      Head: Normocephalic and atraumatic.   Eyes:      Conjunctiva/sclera: Conjunctivae normal.      Pupils: Pupils are equal, round, and reactive to light.      Comments: No corneal arcus   Neck:      Thyroid: No thyromegaly.      Vascular: No JVD.   Cardiovascular:      Rate and Rhythm: Normal rate and regular rhythm.      Pulses:           Radial pulses are 2+ on the right side and 2+ on the left side.        Dorsalis pedis pulses are 2+ on the right side and 2+ on the left side.        Posterior tibial pulses are 2+ on the right side and 2+ on the left side.      Heart sounds: Normal heart sounds. No murmur heard.     No friction rub. No gallop.   Pulmonary:      Effort: " "Pulmonary effort is normal. No respiratory distress.      Breath sounds: Normal breath sounds.   Musculoskeletal:      Cervical back: Normal range of motion and neck supple.      Comments: No achilles tendon thickening  No extensor surface xanthomas at dorsal hands, patellae, achilles   Lymphadenopathy:      Cervical: No cervical adenopathy.   Skin:     General: Skin is warm and dry.      Comments: No periocular xanthelasma   Neurological:      General: No focal deficit present.      Mental Status: He is alert and oriented to person, place, and time.      Cranial Nerves: No cranial nerve deficit.         DATA REVIEW:  Most Recent Lipid Panel:   [facility] 12/30/22    HDL 41    TG 67    [facility] 3/30/23      HDL 37  TG 62  hsCRP 0.71    Lab Results   Component Value Date    CHOLSTRLTOT 200 (H) 11/03/2023    TRIGLYCERIDE 87 11/03/2023    HDL 38 (L) 11/03/2023      Latest Reference Range & Units 11/03/23 06:34   LDL Chol Calc (NIH) 0 - 99 mg/dL 146 (H)   VLDL Cholesterol Calc 5 - 40 mg/dL 16     Lab Results   Component Value Date/Time    LIPOPROTA <8.4 11/03/2023 06:34 AM       Latest Reference Range & Units Most Recent   C-Reactive Protein Cardiac 0.00 - 3.00 mg/L 0.34  11/3/23 06:34     No results found for: \"APOB\"      Other Pertinent Blood Work:   Lab Results   Component Value Date    SODIUM 142 02/16/2022    POTASSIUM 4.5 02/16/2022    CHLORIDE 107 02/16/2022    CO2 23 02/16/2022    ANION 12.0 02/16/2022    GLUCOSE 94 02/16/2022    BUN 20 02/16/2022    CREATININE 1.33 02/16/2022    CALCIUM 9.3 02/16/2022    ASTSGOT 34 02/16/2022    ALTSGPT 26 02/16/2022    ALKPHOSPHAT 51 02/16/2022    TBILIRUBIN 0.6 02/16/2022    ALBUMIN 5.0 (H) 02/16/2022    AGRATIO 2.2 02/16/2022    LIPOPROTA <8.4 11/03/2023    TSHULTRASEN 2.180 02/16/2022     VASCULAR IMAGING:     CACS 2/2023   Coronary calcification:  LMA - 0.0  LCX - 0.0  LAD - 81.5  RCA - 0.0  PDA - 0.0   Total Calcium Score: 81.5   Percentile: Calcium " "score is above the 75th percentile for the patient's age and sex.   Other findings:  Heart: Normal size.  Lungs: Clear.  Mediastinum: Normal.  Upper abdomen: Normal.          ASSESSMENT AND PLAN  1. Primary hypercholesterolemia  Lipid Profile    APOLIPOPROTEIN B    CRP HIGH SENSITIVE (CARDIAC)      2. PSVT (paroxysmal supraventricular tachycardia)        3. Coronary artery calcification seen on CT scan          Patient Type, check all that apply: Primary Prevention    Established Atherosclerotic Cardiovascular Disease (ASCVD): yes    1) subclinical CAD, evidenced by CACS = 81 (90%ile for age/gender)  Low short-term, higher long-term risk based upon %ile   CACS 1-99 favors statin in male >55  FRANCIS risk score w/ CAC = 5.6 %  FRANCIS risk score w/o CAC = 3 %  FRANCIS CAC percentile for age/gender = 90 %     Other Established (non-atherosclerotic) Vascular Disease, if Present: None    Evidence of Heterozygous Familial Hypercholesterolemia (FH): No   FH genotyping:  not recommended    ACC/AHA Indication for Statin Therapy, khris all that apply:  Primary Prevention - 40-74yo, LDLc >70, <190 w/o DM    Calculated Risk for ASCVD, if applicable    The 10-year ASCVD risk score (Aleah JUNE, et al., 2019) is: 3.1%, <5% \"low risk\"    Other Significant Risk Markers, if any, khris all that apply   LP(A) normal  hsCRP low     Risk-enhancers: Persistently elevated LDL-C >159 - improved with TLC     Goal LDL-C and nonHDL-C based on Clinic Protocol  reduce LDL-C 30-49% and LDL-C <100 (if HTG, consider apoB <90, non-HDL-C <130)  At goal? Yes,      LIFESTYLE INTERVENTIONS (primary focus):    SMOKING:    reports that he has never smoked. He has never used smokeless tobacco.   - continued complete avoidance of all tobacco products     PHYSICAL ACTIVITY: continue healthy activity to improve CV fitness.  In general, targeting >150min/week of moderate-level activity or as much as tolerated in light of functional status and co-morbidities     WEIGHT " MANAGEMENT AND NUTRITION: Mediterranean style dietary approach , Plant-based style dietary approach - increase or substitute nuts, beans, proteinaceous complex grains (eg. quinoe), and seeds in lieu of animal proteins 1 to 2 times weekly, and Provide specific dietary approach handouts      LIPID LOWERING MEDICATION MANAGEMENT:     Statin Therapy Recommendations from Today’s Visit:   - not definitively high risk enough for good magnitude of benefit at this time   - through shared MDM, we will hold statin for now, likely will need in future by age 54   - we did review statin ADRs and safety in detail     Non-Statin Medications Recommendations from Today’s Visit: none     Indication for PCSK9 Inhibitor strategy, if applicable: Not currently indicated    Supplements Recommended at this visit: - start plant sterols/stanols 1g two times daily with food and berberine 500mg 3 times daily with food (preferred to reduce GI upset)      RECOMMENDATIONS FOR OTHER CV RISK FACTORS:    1) BLOOD PRESSURE MANAGEMENT:  ACC/AHA (2017) goal <130/80  Home BP at goal: yes  Office BP at goal:  yes   Plan:   - continue healthy diet, activity, weight mgmt   Monitoring:   - routine clinic-based BP measurements at least once annually   Medications: no meds indicated at this time      2) Glycemic Status: Normal    ANTITHROMBOTIC THERAPY None    OTHER ISSUES: none     Studies Ordered at Todays Visit: CACS - 2028  Blood Work Ordered At Today’s visit: as noted above   Follow-Up: 6mo    Morales Fang M.D.  WILMER, Board-certified clinical lipidologist   [facility]   [facility]   [phone]

## 2023-11-21 ENCOUNTER — OFFICE VISIT (OUTPATIENT)
Dept: URGENT CARE | Facility: PHYSICIAN GROUP | Age: 48
End: 2023-11-21
Payer: COMMERCIAL

## 2023-11-21 VITALS
TEMPERATURE: 98.3 F | BODY MASS INDEX: 24.34 KG/M2 | OXYGEN SATURATION: 98 % | HEART RATE: 64 BPM | DIASTOLIC BLOOD PRESSURE: 68 MMHG | SYSTOLIC BLOOD PRESSURE: 112 MMHG | WEIGHT: 170 LBS | HEIGHT: 70 IN | RESPIRATION RATE: 18 BRPM

## 2023-11-21 DIAGNOSIS — L03.011 PARONYCHIA OF RIGHT THUMB: ICD-10-CM

## 2023-11-21 PROCEDURE — 99213 OFFICE O/P EST LOW 20 MIN: CPT | Performed by: FAMILY MEDICINE

## 2023-11-21 PROCEDURE — 3078F DIAST BP <80 MM HG: CPT | Performed by: FAMILY MEDICINE

## 2023-11-21 PROCEDURE — 3074F SYST BP LT 130 MM HG: CPT | Performed by: FAMILY MEDICINE

## 2023-11-21 ASSESSMENT — FIBROSIS 4 INDEX: FIB4 SCORE: 1.34

## 2023-11-21 NOTE — PROGRESS NOTES
"  Subjective:      47 y.o. male presents to urgent care for concerns of a worsening infection to his right thumb.  On Friday he developed sudden pain to the inside of his right thumbnail, without any inciting event or trauma at that time.  It seemed to worsen so he went to the emergency department on Sunday and was given doxycycline.  He continues on the doxycycline today.  He notes the pain has been improving although the is more sparing.  Pain is constant, described as throbbing, currently rated 5/10.  He is using Tylenol and ibuprofen with good relief in symptoms.  He is right-hand dominant.    He denies any other questions or concerns at this time.    Current problem list, medication, and past medical/surgical history were reviewed in Epic.    ROS  See HPI     Objective:      /68   Pulse 64   Temp 36.8 °C (98.3 °F)   Resp 18   Ht 1.778 m (5' 10\")   Wt 77.1 kg (170 lb)   SpO2 98%   BMI 24.39 kg/m²     Physical Exam  Constitutional:       General: He is not in acute distress.     Appearance: He is not diaphoretic.   Cardiovascular:      Rate and Rhythm: Normal rate and regular rhythm.      Heart sounds: Normal heart sounds.   Pulmonary:      Effort: Pulmonary effort is normal. No respiratory distress.      Breath sounds: Normal breath sounds.   Skin:     Comments: Abscess to the side of his right thumbnail.  No open areas or discharge.   Neurological:      Mental Status: He is alert.   Psychiatric:         Mood and Affect: Affect normal.         Judgment: Judgment normal.       Assessment/Plan:     1. Paronychia of right thumb  Procedure: Incision and Drainage of abscess  We discussed risks, benefits, and alternative treatment options. Risks include but are not limited to infection, bleeding, nerve damage, and poor cosmetic outcome. The patient opted to proceed with the I&D. A timeout protocol was performed prior to initiating the I&D.  The area was cleansed with alcohol.  A linear incision along the " skin lines was made with #11 blade and the purulent material expressed. There was minimal bleeding with good hemostasis achieved. The patient tolerated the procedure well without complications. Standard postprocedure care was explained and return precautions are given.  -Continue with doxycycline as prescribed.  He was encouraged to use Epsom salt soaks 2 times daily.  Tylenol and ibuprofen as needed for pain.        Instructed to return to Urgent Care or nearest Emergency Department if symptoms fail to improve, for any change in condition, further concerns, or new concerning symptoms. Patient states understanding of the plan of care and discharge instructions.    Patience Leone M.D.

## 2024-05-18 LAB
APO B SERPL-MCNC: 161 MG/DL
CHOLEST SERPL-MCNC: 188 MG/DL (ref 100–199)
CRP SERPL-MCNC: <1 MG/L (ref 0–10)
HDLC SERPL-MCNC: 38 MG/DL
LABORATORY COMMENT REPORT: ABNORMAL
LDLC SERPL CALC-MCNC: 138 MG/DL (ref 0–99)
TRIGL SERPL-MCNC: 62 MG/DL (ref 0–149)
VLDLC SERPL CALC-MCNC: 12 MG/DL (ref 5–40)

## 2024-05-23 ENCOUNTER — TELEMEDICINE (OUTPATIENT)
Dept: VASCULAR LAB | Facility: MEDICAL CENTER | Age: 49
End: 2024-05-23
Attending: FAMILY MEDICINE
Payer: COMMERCIAL

## 2024-05-23 DIAGNOSIS — I25.10 CORONARY ARTERY CALCIFICATION SEEN ON CT SCAN: ICD-10-CM

## 2024-05-23 DIAGNOSIS — E78.00 PRIMARY HYPERCHOLESTEROLEMIA: ICD-10-CM

## 2024-05-23 DIAGNOSIS — I47.10 PSVT (PAROXYSMAL SUPRAVENTRICULAR TACHYCARDIA) (HCC): ICD-10-CM

## 2024-05-23 DIAGNOSIS — R93.1 AGATSTON CORONARY ARTERY CALCIUM SCORE LESS THAN 100: ICD-10-CM

## 2024-05-23 ASSESSMENT — ENCOUNTER SYMPTOMS
SPUTUM PRODUCTION: 0
FEVER: 0
PALPITATIONS: 0
CHILLS: 0
CLAUDICATION: 0
COUGH: 0
ORTHOPNEA: 0
PND: 0
WHEEZING: 0
HEMOPTYSIS: 0
SHORTNESS OF BREATH: 0

## 2024-05-23 NOTE — PROGRESS NOTES
[facility] FOLLOW-[facility]  05/23/24     Pipo Hinson III has been referred for evaluation and management of dyslipidemia, est 4/2023  Referral Source: Anna Salazar A.P.RN    Subjective     Interval hx/concerns: last seen 11/2023,  continued berberine, stopped PSS.  No health status changes.  Had labs     LIFESTYLE MGMT:  Change in weight: Stable  Wt Readings from Last 3 Encounters:   11/21/23 77.1 kg (170 lb)   11/17/23 76.7 kg (169 lb)   04/14/23 74.8 kg (165 lb)   Exercise habits: strenuous regular exercise program - very active 5/7 days/week   Dietary patterns: reduced red meat, no processed foods, yolks; home cooking  Etoh: No  Reported barriers to care: none     MED MGMT:  Current Prescription:   Statin: None  Non-Statin: None  Supplements: PSS, berberine   Any Current Side Effects? No  Current Adherence: Complete    PERTINENT HLD PMHX:  History of ASCVD: None  Secondary causes of dyslipidemia:none identified    Other Established (non-atherosclerotic) Vascular Disease:  # SVT - stable, much improved since 2022, seeing cardiology, possibly having ablation     Age at Initial Diagnosis of Dyslipidemia: 47    Previously Attempted Interventions for Lipids - including outcome  Statin: None   Outcome: N/A  Non-Statin: None  Outcome: N/A  Any Previous History of Statin Intolerance? No  Baseline Lipids Prior to Treatment:   1/2023: total cholesterol 234, , HDL 41    Anticoagulation/antiplats: No, no hx of bleeding    HTN: no dx, meds      Current Outpatient Medications on File Prior to Visit   Medication Sig Dispense Refill    MAGNESIUM PO Take  by mouth.      Omega-3 Fatty Acids (FISH OIL) 1000 MG Cap capsule Take 1,000 mg by mouth 3 times a day with meals.      VITAMIN D PO Take  by mouth.      CREATINE PO Take  by mouth.      Plant Sterols and Stanols 450 MG Cap Take 1 g by mouth 2 times a day.      Barberry-Oreg Grape-Goldenseal (BERBERINE COMPLEX) 200-200-50 MG Cap Take 500 mg by  mouth 3 times a day.      propranolol (INDERAL) 10 MG Tab Take 1 Tablet by mouth 2 times a day as needed (for SVT episode). 60 Tablet 2     No current facility-administered medications on file prior to visit.    No Known Allergies     Social History     Tobacco Use    Smoking status: Never    Smokeless tobacco: Never   Vaping Use    Vaping status: Never Used   Substance Use Topics    Alcohol use: Never    Drug use: Never     Review of Systems   Constitutional:  Negative for chills, fever and malaise/fatigue.   Respiratory:  Negative for cough, hemoptysis, sputum production, shortness of breath and wheezing.    Cardiovascular:  Negative for chest pain, palpitations, orthopnea, claudication, leg swelling and PND.       Objective    There were no vitals filed for this visit.     BP Readings from Last 5 Encounters:   11/21/23 112/68   11/17/23 114/68   04/14/23 126/72   02/24/22 118/64   02/16/22 117/74     Physical Exam  Constitutional:       General: He is not in acute distress.     Appearance: Normal appearance. He is well-developed. He is not diaphoretic.   HENT:      Head: Normocephalic.   Eyes:      Extraocular Movements: Extraocular movements intact.      Conjunctiva/sclera: Conjunctivae normal.   Pulmonary:      Effort: Pulmonary effort is normal. No respiratory distress.   Skin:     Coloration: Skin is not pale.      Findings: No rash.   Neurological:      Mental Status: He is alert and oriented to person, place, and time.      Cranial Nerves: No cranial nerve deficit.       DATA REVIEW:    [facility] 12/30/22    HDL 41    TG 67    [facility] 3/30/23      HDL 37  TG 62  hsCRP 0.71    Lab Results   Component Value Date    CHOLSTRLTOT 188 05/10/2024    TRIGLYCERIDE 62 05/10/2024    HDL 38 (L) 05/10/2024      Latest Reference Range & Units 11/03/23 06:34 05/10/24 07:03   LDL Chol Calc (NIH) 0 - 99 mg/dL 146 (H) 138 (H)   VLDL Cholesterol Calc 5 - 40 mg/dL 16 12   Apolipoprotein B <90 mg/dL  161 (H)      Lab Results   Component Value Date/Time    LIPOPROTA <8.4 11/03/2023 06:34 AM      Lab Results   Component Value Date/Time    CRPHIGHSEN <1 05/10/2024 07:03 AM       VASCULAR IMAGING    CACS 2/2023   Coronary calcification:  LMA - 0.0  LCX - 0.0  LAD - 81.5  RCA - 0.0  PDA - 0.0   Total Calcium Score: 81.5   Percentile: Calcium score is above the 75th percentile for the patient's age and sex.   Other findings:  Heart: Normal size.  Lungs: Clear.  Mediastinum: Normal.  Upper abdomen: Normal.        ASSESSMENT AND PLAN  1. Primary hypercholesterolemia  Lipid Profile    CRP HIGH SENSITIVE (CARDIAC)    APOLIPOPROTEIN B      2. Coronary artery calcification seen on CT scan        3. Agatston coronary artery calcium score less than 100        4. PSVT (paroxysmal supraventricular tachycardia) (HCC)          Patient Type: Primary Prevention    Established Atherosclerotic Cardiovascular Disease (ASCVD):     1) subclinical CAD, evidenced by CACS = 81 (90%ile for age/gender)  Low short-term, higher long-term risk based upon %ile   Asymptomatic, subclinical CAD (evidenced by CACS = 81 (90%ile for age/gender) in 2023)  - no prior dx ASCVD events  - no LMA plaque noted   - no indications for functional testing w/o symptoms   - as reviewed with pt, ACC/AHA guidelines for chronic CAD mgmt (2023) support GDMT alone as initial mgmt citing multiple RCTs (ISCHEMIA, COURAGE, MELISSA 2D) demonstrating early revasc strategy plus GDMT did not improve MACE outcomes compared to GDMT alone  Plan:  - continue TLC and med mgmt as noted below   - consider functional testing if new symptoms over time      Other Established (non-atherosclerotic) Vascular Disease:    1) SVT - stable, no sx     Evidence of genetic dyslipidemia: No , baseline LDL-C <160  FH genotyping:  not recommended    ACC/AHA Indication for Statin Therapy:  Primary Prevention - 40-76yo, LDLc >70, <190 w/o DM    Calculated Risk for ASCVD  The 10-year ASCVD risk score (Aleah JUNE,  "et al., 2019) is: 3.1%, <5% \"low risk\"    CACS 1-99 favors statin in male >55  FRANCIS risk score w/ CAC = 5.6 % (borderline, 3% absolute risk elevation)  FRANCIS risk score w/o CAC = 3 %  FRANCIS CAC percentile for age/gender = 90 %     PREVENT calc per 2024:  This individual has an estimated 10-year risk of CVD = 2.4%  This individual has an estimated 30-year risk of CVD = 16.4%    Other Significant Risk Markers  Lp(a) <8  apoB = 161   High CACS    Risk-enhancers: Persistently elevated LDL-C >159 - improved with TLC     Goal LDL-C and nonHDL-C based on Clinic Protocol  reduce LDL-C 30-49% and LDL-C <100  (or apoB <90)  At goal? Yes for LDL-C reduction, no for quantitative goal      LIFESTYLE INTERVENTIONS - PRIMARY FOCUS   TOBACCO:continued complete avoidance of all tobacco products   PHYSICAL ACTIVITY: >150min/week of mod-intensity activity or as much as tolerated  NUTRITION: Mediterranean and Plant-based - increase nuts, beans, whole grains, plant protein for animal 1-2 times/week   ETOH: limit to 2 or less standard drinks/day   WT MGMT: maintain healthy weight     LIPID LOWERING MEDICATION MANAGEMENT:     Statin Therapy  - - extensive review of borderline current risk and maximum of intermediate risk on 30-yr calculator   - as reviewed could make a case to use med mgmt, however, the absolute benefit in risk reduction is very small at this time, but this will change with age   - we did review statin ADRs and safety in detail   - we will consider moderate-intensity statin if LDL climbs over time     Non-Statin Medications: none     Indication for PCSK9 Inhibitor strategy: Not currently indicated    Supplements:   - continue plant sterols/stanols 1g two times daily with food and berberine 500mg 3 times daily with food (preferred to reduce GI upset)      BLOOD PRESSURE MANAGEMENT:  ACC/AHA (2017) goal <130/80  Home BP at goal: yes  Office BP at goal:  yes   Plan:   - continue healthy diet, activity, weight mgmt   - routine " clinic-based BP measurements at least once annually   Medications: no meds indicated at this time      GLYCEMIC STATUS: Normal    ANTITHROMBOTIC THERAPY None    OTHER: none     Studies Ordered at Todays Visit: CACS - 2028  Blood Work Ordered At Today’s visit: as noted above   Follow-Up: 6mo    Time: 32min - chart review/prep, review of other providers' records, imaging/lab review, face-to-face time for history/examination, ordering, prescribing,  review of results/meds/ treatment plan with patient/family/caregiver, documentation in EMR, care coordination (as needed)     Morales Fang M.D.  WILMRE, Board-certified clinical lipidologist   [facility]   [facility]   [phone]

## 2024-06-19 ENCOUNTER — HOSPITAL ENCOUNTER (OUTPATIENT)
Dept: RADIOLOGY | Facility: MEDICAL CENTER | Age: 49
End: 2024-06-19
Attending: STUDENT IN AN ORGANIZED HEALTH CARE EDUCATION/TRAINING PROGRAM
Payer: COMMERCIAL

## 2024-06-19 DIAGNOSIS — M25.562 CHRONIC PAIN OF LEFT KNEE: ICD-10-CM

## 2024-06-19 DIAGNOSIS — G89.29 CHRONIC PAIN OF LEFT KNEE: ICD-10-CM

## 2024-06-19 PROCEDURE — 73560 X-RAY EXAM OF KNEE 1 OR 2: CPT | Mod: LT

## 2024-08-28 LAB
APO B SERPL-MCNC: NORMAL MG/DL
CHOLEST SERPL-MCNC: 188 MG/DL (ref 100–199)
CRP SERPL-MCNC: <1 MG/L (ref 0–10)
HDLC SERPL-MCNC: 38 MG/DL
LDLC SERPL CALC-MCNC: 138 MG/DL (ref 0–99)
TRIGL SERPL-MCNC: 62 MG/DL (ref 0–149)
VLDLC SERPL CALC-MCNC: 12 MG/DL (ref 5–40)

## 2024-11-01 LAB
CHOLEST SERPL-MCNC: 178 MG/DL (ref 100–199)
HDLC SERPL-MCNC: 37 MG/DL
LDL CALC COMMENT:: ABNORMAL
LDLC SERPL CALC-MCNC: 129 MG/DL (ref 0–99)
TRIGL SERPL-MCNC: 60 MG/DL (ref 0–149)
VLDLC SERPL CALC-MCNC: 12 MG/DL (ref 5–40)

## 2024-11-07 ENCOUNTER — APPOINTMENT (OUTPATIENT)
Dept: VASCULAR LAB | Facility: MEDICAL CENTER | Age: 49
End: 2024-11-07
Payer: COMMERCIAL

## 2024-11-11 ENCOUNTER — OFFICE VISIT (OUTPATIENT)
Dept: VASCULAR LAB | Facility: MEDICAL CENTER | Age: 49
End: 2024-11-11
Attending: FAMILY MEDICINE
Payer: COMMERCIAL

## 2024-11-11 VITALS
BODY MASS INDEX: 24.32 KG/M2 | HEART RATE: 58 BPM | SYSTOLIC BLOOD PRESSURE: 115 MMHG | WEIGHT: 169.9 LBS | HEIGHT: 70 IN | DIASTOLIC BLOOD PRESSURE: 71 MMHG

## 2024-11-11 DIAGNOSIS — E78.00 PRIMARY HYPERCHOLESTEROLEMIA: ICD-10-CM

## 2024-11-11 DIAGNOSIS — I47.10 PSVT (PAROXYSMAL SUPRAVENTRICULAR TACHYCARDIA) (HCC): ICD-10-CM

## 2024-11-11 DIAGNOSIS — E78.6 LOW HDL (UNDER 40): ICD-10-CM

## 2024-11-11 DIAGNOSIS — I47.10 SVT (SUPRAVENTRICULAR TACHYCARDIA) (HCC): ICD-10-CM

## 2024-11-11 DIAGNOSIS — R93.1 AGATSTON CORONARY ARTERY CALCIUM SCORE LESS THAN 100: ICD-10-CM

## 2024-11-11 DIAGNOSIS — I25.10 CORONARY ARTERY CALCIFICATION SEEN ON CT SCAN: ICD-10-CM

## 2024-11-11 PROCEDURE — 3078F DIAST BP <80 MM HG: CPT | Performed by: FAMILY MEDICINE

## 2024-11-11 PROCEDURE — 3074F SYST BP LT 130 MM HG: CPT | Performed by: FAMILY MEDICINE

## 2024-11-11 PROCEDURE — 99214 OFFICE O/P EST MOD 30 MIN: CPT | Performed by: FAMILY MEDICINE

## 2024-11-11 PROCEDURE — 99212 OFFICE O/P EST SF 10 MIN: CPT

## 2024-11-11 RX ORDER — PROPRANOLOL HYDROCHLORIDE 10 MG/1
10 TABLET ORAL 2 TIMES DAILY PRN
Qty: 60 TABLET | Refills: 2 | Status: SHIPPED | OUTPATIENT
Start: 2024-11-11

## 2024-11-11 RX ORDER — ROSUVASTATIN CALCIUM 5 MG/1
5 TABLET, COATED ORAL DAILY
Qty: 100 TABLET | Refills: 3 | Status: SHIPPED | OUTPATIENT
Start: 2024-11-11

## 2024-11-11 ASSESSMENT — ENCOUNTER SYMPTOMS
HEMOPTYSIS: 0
WHEEZING: 0
SPUTUM PRODUCTION: 0
CLAUDICATION: 0
ORTHOPNEA: 0
SHORTNESS OF BREATH: 0
CHILLS: 0
COUGH: 0
FEVER: 0
PND: 0
PALPITATIONS: 0

## 2024-11-11 NOTE — PROGRESS NOTES
[facility] - FOLLOW-UP  11/11/24     Pipo Lagos Sravan RUIZ ref for eval/mgmt of dyslipidemia, est 4/2023  Referral Source: Anna Salazar A.P.RN    Subjective     Interval hx/concerns: last seen  5/2024, had labs done.  Feeling well.  Wondering about starting statin therapy based upon current risk profile. No new sx     LIFESTYLE MGMT:  Change in weight: Stable  Exercise habits: strenuous regular exercise program - very active 5/7 days/week   Dietary patterns: reduced red meat, no processed foods, yolks; home cooking  Etoh: No  Reported barriers to care: none     MED MGMT:  Current Prescription:   Statin: None  Non-Statin: None  Supplements: berberine, prior  stopped plant sterols/stanols   Side Effects? No  Current Adherence: Complete    PERTINENT HLD PMHX:  History of ASCVD: None  Other Established Vascular Disease:  # SVT - stable, much improved since 2022, seeing cardiology, possibly having ablation     Age at Initial Diagnosis of Dyslipidemia: 47    Previously Attempted Interventions for Lipids - including outcome  Statin: None   Outcome: N/A  Non-Statin: None  Outcome: N/A  Any Previous History of Statin Intolerance? No  Baseline Lipids Prior to Treatment:   1/2023: total cholesterol 234, , HDL 41      PSVT:  mild recurrent episodes but only a few over several months.  Unclear etiology, mild symptomatic, no CP or syncope.  Needs propranolol refills - works well     Anticoagulation/antiplats: No, no hx of bleeding    HTN: no dx, meds      Current Outpatient Medications on File Prior to Visit   Medication Sig Dispense Refill    MAGNESIUM PO Take  by mouth.      CREATINE PO Take  by mouth.      Barberry-Oreg Grape-Goldenseal (BERBERINE COMPLEX) 200-200-50 MG Cap Take 500 mg by mouth 3 times a day.       No current facility-administered medications on file prior to visit.    No Known Allergies     Social History     Tobacco Use    Smoking status: Never    Smokeless tobacco: Never  "  Vaping Use    Vaping status: Never Used   Substance Use Topics    Alcohol use: Never    Drug use: Never     Review of Systems   Constitutional:  Negative for chills, fever and malaise/fatigue.   Respiratory:  Negative for cough, hemoptysis, sputum production, shortness of breath and wheezing.    Cardiovascular:  Negative for chest pain, palpitations, orthopnea, claudication, leg swelling and PND.       Objective    Vitals:    11/11/24 1026   BP: 115/71   BP Location: Left arm   Patient Position: Sitting   BP Cuff Size: Large adult   Pulse: (!) 58   Weight: 77.1 kg (169 lb 14.4 oz)   Height: 1.778 m (5' 10\")     BP Readings from Last 5 Encounters:   11/11/24 115/71   11/21/23 112/68   11/17/23 114/68   04/14/23 126/72   02/24/22 118/64     BMI Readings from Last 1 Encounters:   11/11/24 24.38 kg/m²      Wt Readings from Last 3 Encounters:   11/11/24 77.1 kg (169 lb 14.4 oz)   11/21/23 77.1 kg (170 lb)   11/17/23 76.7 kg (169 lb)      Physical Exam  Constitutional:       General: He is not in acute distress.     Appearance: Normal appearance. He is well-developed. He is not diaphoretic.   HENT:      Head: Normocephalic.   Eyes:      Extraocular Movements: Extraocular movements intact.      Conjunctiva/sclera: Conjunctivae normal.   Pulmonary:      Effort: Pulmonary effort is normal. No respiratory distress.   Skin:     Coloration: Skin is not pale.      Findings: No rash.   Neurological:      Mental Status: He is alert and oriented to person, place, and time.      Cranial Nerves: No cranial nerve deficit.       DATA REVIEW:    [facility] 12/30/22    HDL 41    TG 67    [facility] 3/30/23      HDL 37  TG 62  hsCRP 0.71    Lab Results   Component Value Date    CHOLSTRLTOT 178 10/31/2024    TRIGLYCERIDE 60 10/31/2024    HDL 37 (L) 10/31/2024      Latest Reference Range & Units 11/03/23 06:34 05/10/24 07:03 10/31/24 05:47   LDL Chol Calc (NIH) 0 - 99 mg/dL 146 (H) 138 (H) 129 (H)   VLDL Cholesterol Calc 5 " "- 40 mg/dL 16 12 12     Lab Results   Component Value Date/Time    LIPOPROTA <8.4 11/03/2023 06:34 AM      No results found for: \"APOB\"   Lab Results   Component Value Date/Time    CRPHIGHSEN <1 05/10/2024 07:03 AM         VASCULAR IMAGING    CACS 2/2023   Coronary calcification:  LMA - 0.0  LCX - 0.0  LAD - 81.5  RCA - 0.0  PDA - 0.0  Total Calcium Score: 81.5  Percentile: Calcium score is above the 75th percentile for the patient's age and sex.   Other findings:  Heart: Normal size.  Lungs: Clear.  Mediastinum: Normal.  Upper abdomen: Normal.        ASSESSMENT AND PLAN  1. Primary hypercholesterolemia  rosuvastatin (CRESTOR) 5 MG Tab    APOLIPOPROTEIN B    Comp Metabolic Panel    Lipid Profile      2. PSVT (paroxysmal supraventricular tachycardia) ([facility])  propranolol (INDERAL) 10 MG Tab      3. SVT (supraventricular tachycardia) ([facility])        4. Coronary artery calcification seen on CT scan  HEMOGLOBIN A1C    INSULIN FASTING      5. Agatston coronary artery calcium score less than 100        6. Low HDL (under 40)  HEMOGLOBIN A1C    INSULIN FASTING          Patient Type: Primary Prevention, high risk due to CACS >75%ile for age/gender 2023    MAJOR ASCVD:  no    Other Established Vascular Disease:    1) subclinical CAD, evidenced by CACS = 81 (90%ile for age/gender)  Low short-term, higher long-term risk based upon %ile   Asymptomatic, subclinical CAD (evidenced by CACS = 81 (90%ile for age/gender) in 2023)  - no prior dx ASCVD events  - no LMA plaque noted   - no indications for functional testing w/o symptoms   - as reviewed with pt, ACC/AHA guidelines for chronic CAD mgmt (2023) support GDMT alone as initial mgmt citing multiple RCTs (ISCHEMIA, COURAGE, MELISSA 2D) demonstrating early revasc strategy plus GDMT did not improve MACE outcomes compared to GDMT alone  Plan:  - plan repeat CACS 2028   - continue lifestyle and med mgmt as noted below   - consider functional testing if new symptoms over time      2) SVT - " "stable, no sx     Evidence of genetic dyslipidemia: No , baseline LDL-C <160  FH genotyping:  not recommended    ACC/AHA Indication for Statin Therapy:  Primary Prevention - 40-76yo, LDLc >70, <190 w/o DM    Calculated Risk for ASCVD  The 10-year ASCVD risk score (Aleah JUNE, et al., 2019) is: 3%, <5% \"low risk\"    CACS 1-99 favors statin in male >55  FRANCIS risk score w/ CAC = 5.6 % (borderline, 3% absolute risk elevation)  FRANCIS risk score w/o CAC = 3 %  FRANCIS CAC percentile for age/gender = 90 %     PREVENT calc per 2024:  This individual has an estimated 10-year risk of CVD = 2.4%  This individual has an estimated 30-year risk of CVD = 16.4%    Other Significant Risk Markers  Lp(a) <8  apoB = 161   hsCRP 0.34   High CACS - see discussion above     Risk-enhancers: Persistently elevated LDL-C >159 - improved with TLC     Goal LDL-C and nonHDL-C based on Clinic Protocol  reduce LDL-C 30-49% and LDL-C <100  (or apoB <90)  At goal? Yes for LDL-C reduction, no for quantitative goal      LIFESTYLE INTERVENTIONS - PRIMARY FOCUS   TOBACCO:continued complete avoidance of all tobacco products   PHYSICAL ACTIVITY: >150min/week of mod-intensity activity or as much as tolerated  NUTRITION: Mediterranean and Plant-based - increase nuts, beans, whole grains, plant protein for animal 1-2 times/week   ETOH: limit to 2 or less standard drinks/day   WT MGMT: maintain healthy weight     LIPID LOWERING MEDICATION MANAGEMENT:     Statin Therapy  - extensive review of borderline current risk and maximum of intermediate risk on 30-yr calculator  and changing risk profile based upon age  - aim is to reduce lifetime exposure to high LDL-C concentrations to reduce further plaque deposition over time   - Reviewed risks/benefits/alternatives for lipid-lowering therapies, and through shared decision-making we have opted to initiate statin therapy to lower ASCVD risk.     - start rosvua 5mg daily     Non-Statin Medications: none     Indication for " PCSK9 Inhibitor strategy: Not currently indicated    Supplements:   - continue berberine 500mg 3 times daily with food (preferred to reduce GI upset)      BLOOD PRESSURE MANAGEMENT:  ACC/AHA (2017) goal <130/80  Home BP at goal: yes  Office BP at goal:  yes   Plan:   - continue healthy diet, activity, weight mgmt   - routine clinic-based BP measurements at least once annually   Medications: no meds indicated at this time      GLYCEMIC STATUS: Normal    ANTITHROMBOTIC THERAPY None    OTHER: none     STUDIES: repeat CACS 2028   LABS: as noted above  Follow up in: RTC in 6 months     Morales Fang M.D.  WILMER, Board-certified clinical lipidologist   [facility]   [facility]   [phone]

## 2025-02-11 DIAGNOSIS — E78.00 PRIMARY HYPERCHOLESTEROLEMIA: ICD-10-CM

## 2025-02-11 RX ORDER — ROSUVASTATIN CALCIUM 5 MG/1
5 TABLET, COATED ORAL DAILY
Qty: 100 TABLET | Refills: 3 | Status: SHIPPED | OUTPATIENT
Start: 2025-02-11

## 2025-03-14 ENCOUNTER — OFFICE VISIT (OUTPATIENT)
Dept: MEDICAL GROUP | Facility: IMAGING CENTER | Age: 50
End: 2025-03-14
Payer: COMMERCIAL

## 2025-03-14 VITALS
TEMPERATURE: 98 F | HEART RATE: 44 BPM | BODY MASS INDEX: 24.17 KG/M2 | DIASTOLIC BLOOD PRESSURE: 60 MMHG | OXYGEN SATURATION: 97 % | RESPIRATION RATE: 12 BRPM | HEIGHT: 70 IN | WEIGHT: 168.8 LBS | SYSTOLIC BLOOD PRESSURE: 108 MMHG

## 2025-03-14 DIAGNOSIS — Z13.21 ENCOUNTER FOR VITAMIN DEFICIENCY SCREENING: ICD-10-CM

## 2025-03-14 DIAGNOSIS — R93.1 AGATSTON CORONARY ARTERY CALCIUM SCORE LESS THAN 100: ICD-10-CM

## 2025-03-14 DIAGNOSIS — Z11.59 NEED FOR HEPATITIS C SCREENING TEST: ICD-10-CM

## 2025-03-14 DIAGNOSIS — Z12.11 SCREENING FOR COLON CANCER: ICD-10-CM

## 2025-03-14 DIAGNOSIS — Z11.4 SCREENING FOR HIV (HUMAN IMMUNODEFICIENCY VIRUS): ICD-10-CM

## 2025-03-14 DIAGNOSIS — E78.5 HYPERLIPIDEMIA, UNSPECIFIED HYPERLIPIDEMIA TYPE: ICD-10-CM

## 2025-03-14 DIAGNOSIS — I47.10 PSVT (PAROXYSMAL SUPRAVENTRICULAR TACHYCARDIA) (HCC): ICD-10-CM

## 2025-03-14 PROBLEM — E78.6 LOW HDL (UNDER 40): Status: RESOLVED | Noted: 2024-11-11 | Resolved: 2025-03-14

## 2025-03-14 PROCEDURE — 3078F DIAST BP <80 MM HG: CPT | Performed by: STUDENT IN AN ORGANIZED HEALTH CARE EDUCATION/TRAINING PROGRAM

## 2025-03-14 PROCEDURE — 3074F SYST BP LT 130 MM HG: CPT | Performed by: STUDENT IN AN ORGANIZED HEALTH CARE EDUCATION/TRAINING PROGRAM

## 2025-03-14 PROCEDURE — 99214 OFFICE O/P EST MOD 30 MIN: CPT | Performed by: STUDENT IN AN ORGANIZED HEALTH CARE EDUCATION/TRAINING PROGRAM

## 2025-03-14 NOTE — PROGRESS NOTES
Subjective:       CC:   Chief Complaint   Patient presents with    Establish Care     Pcp [facility] Dr Gui Fang at [facility]     Requesting Labs     Lipids and blood sugar        HPI:     Verbal consent was acquired by the patient to use [employer] ambient listening note generation during this visit Elder Foss is a 49 y.o. male is new to me and is here today to establish care. Previous PCP was at [facility].  Pt would like to discuss the following today    History of Present Illness    Establish care  He has a documented history of hypercholesterolemia, with the presence of atherosclerotic plaque identified on a computed tomography (CT) scan. He is currently under the care of Dr. Fang at [facility]. The patient has expressed concerns regarding potential risk factors associated with his cardiac calcium score and the use of statins. He reports no other known diagnoses, aside from orthopedic issues. He has initiated vitamin D supplementation and intermittently uses creatine 5 mg daily. Additionally, he takes berberine inconsistently and is making efforts to improve adherence. He underwent a Cologuard test 3 years ago and has recently received a reminder for a repeat test. He receives annual influenza vaccinations and has completed the initial COVID-19 vaccination series but has not received any boosters. He has not received the shingles vaccine, despite a history of varicella (chickenpox) in childhood. He is uncertain about his pneumococcal vaccination status and will verify his tetanus vaccination status. He has completed the hepatitis B vaccination series. He reports no known medication allergies. He does not smoke, consumes alcohol socially, and does not use illicit drugs. He occasionally uses a cannabidiol (CBD) supplement, approximately once weekly. He has a half-brother. He is currently on rosuvastatin 5 mg.    PSVT  The patient has a documented history  of paroxysmal supraventricular tachycardia (PSVT), which he manages with propranolol on an as-needed basis during episodes of SVT. These episodes are characterized by palpitations, dyspnea, and cessation of all activities. He maintains a log of these episodes, which occur sporadically, sometimes only a few times annually, and can be absent for up to a year. Previously, these episodes necessitated emergency room visits, but he now manages them at home until they resolve. During these episodes, he experiences chest pain, lightheadedness, dyspnea, and a significantly elevated pulse rate, which typically last between 25 to 35 minutes. He attempts to manage these symptoms with propranolol and rest, allowing the heart to spontaneously cardiovert. His initial episode required an adenosine push for resolution. He consulted with Dr. Aly Meraz in 2022 regarding potential ablation therapy for SVT but did not proceed with the procedure. He is on propranolol as needed.    Supplemental Information  The patient has undergone two surgeries on his left knee for arthritis, a torn meniscus, and a partial anterior cruciate ligament (ACL) tear, and one surgery on his right shoulder.    SOCIAL HISTORY  - Does not smoke  - Drinks alcohol socially  - Does not use drugs, but takes CBD supplement once weekly  - Grew up in [place]            ROS:   See HPI    Patient Active Problem List    Diagnosis Date Noted    Coronary artery calcification seen on CT scan 04/14/2023    Agatston coronary artery calcium score less than 100 04/14/2023    PSVT (paroxysmal supraventricular tachycardia) (HCC) 04/14/2023    Primary hypercholesterolemia 04/14/2023    Seasonal allergies 01/13/2023    Hyperlipidemia 05/25/2017       Past Medical History:   Diagnosis Date    SVT (supraventricular tachycardia) (HCC)        Current Outpatient Medications   Medication Sig Dispense Refill    VITAMIN D, CHOLECALCIFEROL, PO Take  by mouth.      rosuvastatin (CRESTOR) 5 MG  Tab Take 1 Tablet by mouth every day. To lower cholesterol and heart disease risk 100 Tablet 3    propranolol (INDERAL) 10 MG Tab Take 1 Tablet by mouth 2 times a day as needed (for SVT episode). 60 Tablet 2    CREATINE PO Take  by mouth.      Barberry-Oreg Grape-Goldenseal (BERBERINE COMPLEX) 200-200-50 MG Cap Take 500 mg by mouth 3 times a day.       No current facility-administered medications for this visit.       Allergies as of 03/14/2025    (No Known Allergies)       Social History     Socioeconomic History    Marital status:      Spouse name: Not on file    Number of children: Not on file    Years of education: Not on file    Highest education level: Not on file   Occupational History    Not on file   Tobacco Use    Smoking status: Never    Smokeless tobacco: Never   Vaping Use    Vaping status: Never Used   Substance and Sexual Activity    Alcohol use: Yes     Comment: socially monthly    Drug use: Not on file     Comment: [facility] sleep with [employer]    Sexual activity: Not on file   Other Topics Concern    Not on file   Social History Narrative    Not on file     Social Drivers of Health     Financial Resource Strain: Not on file   Food Insecurity: Not on file   Transportation Needs: Not on file   Physical Activity: Not on file   Stress: Not on file   Social Connections: Not on file   Intimate Partner Violence: Not on file   Housing Stability: Not on file       Family History   Problem Relation Age of Onset    No Known Problems Mother     Hyperlipidemia Father         statin    No Known Problems Brother     No Known Problems Daughter     No Known Problems Son     Clotting Disorder Neg Hx     Stroke Neg Hx     Heart Attack Neg Hx     Cancer Neg Hx        Past Surgical History:   Procedure Laterality Date    OTHER ORTHOPEDIC SURGERY      L knee, R shoulder           Objective:     /60 (BP Location: Right arm, Patient Position: Sitting, BP Cuff Size: Adult)   Pulse (!) 44   Temp 36.7 °C (98 °F)  "(Temporal)   Resp 12   Ht 1.778 m (5' 10\")   Wt 76.6 kg (168 lb 12.8 oz)   SpO2 97%   BMI 24.22 kg/m²     Physical Exam  Vitals reviewed.   Constitutional:       General: He is not in acute distress.     Appearance: Normal appearance. He is not toxic-appearing.   HENT:      Head: Normocephalic and atraumatic.      Right Ear: Tympanic membrane, ear canal and external ear normal. There is no impacted cerumen.      Left Ear: Tympanic membrane, ear canal and external ear normal. There is no impacted cerumen.      Nose: Nose normal. No congestion.      Mouth/Throat:      Mouth: Mucous membranes are moist.      Pharynx: Oropharynx is clear. No oropharyngeal exudate or posterior oropharyngeal erythema.   Eyes:      General: No scleral icterus.     Extraocular Movements: Extraocular movements intact.      Conjunctiva/sclera: Conjunctivae normal.      Pupils: Pupils are equal, round, and reactive to light.   Neck:      Thyroid: No thyroid mass, thyromegaly or thyroid tenderness.      Vascular: No carotid bruit.   Cardiovascular:      Rate and Rhythm: Normal rate and regular rhythm.      Pulses: Normal pulses.      Heart sounds: Normal heart sounds. No murmur heard.  Pulmonary:      Effort: Pulmonary effort is normal. No respiratory distress.      Breath sounds: Normal breath sounds. No wheezing.   Abdominal:      General: Abdomen is flat. Bowel sounds are normal. There is no distension.      Palpations: Abdomen is soft. There is no mass.      Tenderness: There is no abdominal tenderness.   Musculoskeletal:         General: No swelling, tenderness or deformity. Normal range of motion.      Cervical back: Normal range of motion and neck supple. No tenderness.   Lymphadenopathy:      Cervical: No cervical adenopathy.   Skin:     General: Skin is warm and dry.      Coloration: Skin is not jaundiced.      Findings: No bruising.   Neurological:      General: No focal deficit present.      Mental Status: He is alert and " oriented to person, place, and time.      Motor: No weakness.      Coordination: Coordination normal.      Gait: Gait normal.   Psychiatric:         Mood and Affect: Mood normal.         Behavior: Behavior normal.         Thought Content: Thought content normal.         Judgment: Judgment normal.              Assessment and Plan:     Assessment & Plan    1. Hyperlipidemia, unspecified hyperlipidemia type  2. Agatston coronary artery calcium score less than 100  Chronic, established condition. Stable. Will update labs. Recommend to continue with Rosuvastatin 5mg daily. Recommend to follow up with vascular health as recommended.   - HEMOGLOBIN A1C; Future  - TSH WITH REFLEX TO FT4; Future  - Comp Metabolic Panel; Future  - CBC WITH DIFFERENTIAL; Future  - Lipid Profile; Future      3. PSVT (paroxysmal supraventricular tachycardia) (HCC)  Chronic, established condition. Well controlled. Patient takes Propranolol 10mg as needed. Recommend to return to clinic if symptoms become more frequent.    4. Need for hepatitis C screening test  - HEP C VIRUS ANTIBODY; Future    5. Screening for HIV (human immunodeficiency virus)  - HIV AG/AB COMBO ASSAY SCREENING; Future    6. Encounter for vitamin deficiency screening  - VITAMIN D 25-HYDROXY    7. Screening for colon cancer  Patient is deciding between Cologuard and colonoscopy.  Orders placed.  - Referral to GI for Colonoscopy  - Cologuard® colon cancer screening             Pt agreed with treatment plan and verbalized understanding.       Return in about 1 year (around 3/14/2026) for Annual Wellness Visit or sooner if needed.     Please note that this dictation was created using voice recognition software. I have made every reasonable attempt to correct obvious errors, but I expect that there are errors of grammar and possibly content that I did not discover before finalizing the note.    Ilsa Lorenzo PA-C  [facility]

## 2025-03-14 NOTE — PATIENT INSTRUCTIONS
Thank you for choosing [facility]. It was a pleasure meeting you today.     Take care!  [facility]

## 2025-03-19 NOTE — Clinical Note
REFERRAL APPROVAL NOTICE         Sent on March 18, 2025                   Pipo Hinson III  [address] [place] [address]                   Dear Mr. Hinson,    After a careful review of the medical information and benefit coverage, [facility] has processed your referral. See below for additional details.    If applicable, you must be actively enrolled with your insurance for coverage of the authorized service. If you have any questions regarding your coverage, please contact your insurance directly.    REFERRAL INFORMATION   Referral #:  [id number]  Referred-To Provider    Referred-By Provider:  Gastroenterology    Ilsa Lorenzo P.A.-C.   [facility]      [address] [place] [address]  [phone] [address] [place] [address]  [phone]    Referral Start Date:  03/14/2025  Referral End Date:   03/14/2026             SCHEDULING  If you do not already have an appointment, please call [phone] to make an appointment.     MORE INFORMATION  If you do not already have a [employer] account, sign up at: [email or web address]  You can access your medical information, make appointments, see lab results, billing information, and more.  If you have questions regarding this referral, please contact  the [facility] Referrals department at:             [phone]. Monday - Friday 8:00AM - 5:00PM.     Sincerely,    [facility]

## 2025-04-01 ENCOUNTER — APPOINTMENT (OUTPATIENT)
Dept: LAB | Facility: MEDICAL CENTER | Age: 50
End: 2025-04-01
Payer: COMMERCIAL

## 2025-04-03 DIAGNOSIS — Z01.89 ENCOUNTER FOR LABORATORY TEST: ICD-10-CM

## 2025-04-10 ENCOUNTER — RESULTS FOLLOW-UP (OUTPATIENT)
Dept: MEDICAL GROUP | Facility: IMAGING CENTER | Age: 50
End: 2025-04-10
Payer: COMMERCIAL

## 2025-04-10 LAB — NONINV COLON CA DNA+OCC BLD SCRN STL QL: NEGATIVE

## 2025-04-14 ENCOUNTER — HOSPITAL ENCOUNTER (OUTPATIENT)
Dept: LAB | Facility: MEDICAL CENTER | Age: 50
End: 2025-04-14
Attending: STUDENT IN AN ORGANIZED HEALTH CARE EDUCATION/TRAINING PROGRAM
Payer: COMMERCIAL

## 2025-04-14 DIAGNOSIS — R93.1 AGATSTON CORONARY ARTERY CALCIUM SCORE LESS THAN 100: ICD-10-CM

## 2025-04-14 DIAGNOSIS — Z11.59 NEED FOR HEPATITIS C SCREENING TEST: ICD-10-CM

## 2025-04-14 DIAGNOSIS — Z11.4 SCREENING FOR HIV (HUMAN IMMUNODEFICIENCY VIRUS): ICD-10-CM

## 2025-04-14 DIAGNOSIS — E78.5 HYPERLIPIDEMIA, UNSPECIFIED HYPERLIPIDEMIA TYPE: ICD-10-CM

## 2025-04-14 LAB
BASOPHILS # BLD AUTO: 0.8 % (ref 0–1.8)
BASOPHILS # BLD: 0.04 K/UL (ref 0–0.12)
EOSINOPHIL # BLD AUTO: 0.07 K/UL (ref 0–0.51)
EOSINOPHIL NFR BLD: 1.3 % (ref 0–6.9)
ERYTHROCYTE [DISTWIDTH] IN BLOOD BY AUTOMATED COUNT: 39.5 FL (ref 35.9–50)
EST. AVERAGE GLUCOSE BLD GHB EST-MCNC: 108 MG/DL
HBA1C MFR BLD: 5.4 % (ref 4–5.6)
HCT VFR BLD AUTO: 48.6 % (ref 42–52)
HGB BLD-MCNC: 16.8 G/DL (ref 14–18)
IMM GRANULOCYTES # BLD AUTO: 0.01 K/UL (ref 0–0.11)
IMM GRANULOCYTES NFR BLD AUTO: 0.2 % (ref 0–0.9)
LYMPHOCYTES # BLD AUTO: 1.52 K/UL (ref 1–4.8)
LYMPHOCYTES NFR BLD: 28.8 % (ref 22–41)
MCH RBC QN AUTO: 32.2 PG (ref 27–33)
MCHC RBC AUTO-ENTMCNC: 34.6 G/DL (ref 32.3–36.5)
MCV RBC AUTO: 93.3 FL (ref 81.4–97.8)
MONOCYTES # BLD AUTO: 0.47 K/UL (ref 0–0.85)
MONOCYTES NFR BLD AUTO: 8.9 % (ref 0–13.4)
NEUTROPHILS # BLD AUTO: 3.16 K/UL (ref 1.82–7.42)
NEUTROPHILS NFR BLD: 60 % (ref 44–72)
NRBC # BLD AUTO: 0.05 K/UL
NRBC BLD-RTO: 0.9 /100 WBC (ref 0–0.2)
PLATELET # BLD AUTO: 237 K/UL (ref 164–446)
PMV BLD AUTO: 10.5 FL (ref 9–12.9)
RBC # BLD AUTO: 5.21 M/UL (ref 4.7–6.1)
WBC # BLD AUTO: 5.3 K/UL (ref 4.8–10.8)

## 2025-04-14 PROCEDURE — 86803 HEPATITIS C AB TEST: CPT

## 2025-04-14 PROCEDURE — 84443 ASSAY THYROID STIM HORMONE: CPT

## 2025-04-14 PROCEDURE — 36415 COLL VENOUS BLD VENIPUNCTURE: CPT

## 2025-04-14 PROCEDURE — 85025 COMPLETE CBC W/AUTO DIFF WBC: CPT

## 2025-04-14 PROCEDURE — 82306 VITAMIN D 25 HYDROXY: CPT

## 2025-04-14 PROCEDURE — 80061 LIPID PANEL: CPT

## 2025-04-14 PROCEDURE — 87389 HIV-1 AG W/HIV-1&-2 AB AG IA: CPT

## 2025-04-14 PROCEDURE — 80053 COMPREHEN METABOLIC PANEL: CPT

## 2025-04-14 PROCEDURE — 83036 HEMOGLOBIN GLYCOSYLATED A1C: CPT

## 2025-04-15 ENCOUNTER — RESULTS FOLLOW-UP (OUTPATIENT)
Dept: MEDICAL GROUP | Facility: IMAGING CENTER | Age: 50
End: 2025-04-15
Payer: COMMERCIAL

## 2025-04-15 LAB
25(OH)D3 SERPL-MCNC: 65 NG/ML (ref 30–100)
ALBUMIN SERPL BCP-MCNC: 4.8 G/DL (ref 3.2–4.9)
ALBUMIN/GLOB SERPL: 1.8 G/DL
ALP SERPL-CCNC: 73 U/L (ref 30–99)
ALT SERPL-CCNC: 37 U/L (ref 2–50)
ANION GAP SERPL CALC-SCNC: 11 MMOL/L (ref 7–16)
AST SERPL-CCNC: 39 U/L (ref 12–45)
BILIRUB SERPL-MCNC: 1.1 MG/DL (ref 0.1–1.5)
BUN SERPL-MCNC: 16 MG/DL (ref 8–22)
CALCIUM ALBUM COR SERPL-MCNC: 9.1 MG/DL (ref 8.5–10.5)
CALCIUM SERPL-MCNC: 9.7 MG/DL (ref 8.5–10.5)
CHLORIDE SERPL-SCNC: 103 MMOL/L (ref 96–112)
CHOLEST SERPL-MCNC: 153 MG/DL (ref 100–199)
CO2 SERPL-SCNC: 26 MMOL/L (ref 20–33)
CREAT SERPL-MCNC: 1.13 MG/DL (ref 0.5–1.4)
GFR SERPLBLD CREATININE-BSD FMLA CKD-EPI: 79 ML/MIN/1.73 M 2
GLOBULIN SER CALC-MCNC: 2.6 G/DL (ref 1.9–3.5)
GLUCOSE SERPL-MCNC: 93 MG/DL (ref 65–99)
HCV AB SER QL: NORMAL
HDLC SERPL-MCNC: 40 MG/DL
HIV 1+2 AB+HIV1 P24 AG SERPL QL IA: NORMAL
LDLC SERPL CALC-MCNC: 104 MG/DL
POTASSIUM SERPL-SCNC: 4.2 MMOL/L (ref 3.6–5.5)
PROT SERPL-MCNC: 7.4 G/DL (ref 6–8.2)
SODIUM SERPL-SCNC: 140 MMOL/L (ref 135–145)
TRIGL SERPL-MCNC: 46 MG/DL (ref 0–149)
TSH SERPL DL<=0.005 MIU/L-ACNC: 1.76 UIU/ML (ref 0.38–5.33)

## 2025-04-15 NOTE — RESULT ENCOUNTER NOTE
Spoke with pt and attempted to schedule an appt. Pt will call back when he is available to schedule.

## 2025-05-08 ENCOUNTER — TELEMEDICINE (OUTPATIENT)
Dept: VASCULAR LAB | Facility: MEDICAL CENTER | Age: 50
End: 2025-05-08
Attending: FAMILY MEDICINE
Payer: COMMERCIAL

## 2025-05-08 DIAGNOSIS — E78.00 PRIMARY HYPERCHOLESTEROLEMIA: ICD-10-CM

## 2025-05-08 DIAGNOSIS — I25.10 CORONARY ARTERY CALCIFICATION SEEN ON CT SCAN: ICD-10-CM

## 2025-05-08 DIAGNOSIS — R93.1 AGATSTON CORONARY ARTERY CALCIUM SCORE LESS THAN 100: ICD-10-CM

## 2025-05-08 DIAGNOSIS — I47.10 PSVT (PAROXYSMAL SUPRAVENTRICULAR TACHYCARDIA) (HCC): ICD-10-CM

## 2025-05-08 PROCEDURE — 99214 OFFICE O/P EST MOD 30 MIN: CPT | Mod: 95 | Performed by: FAMILY MEDICINE

## 2025-05-08 ASSESSMENT — ENCOUNTER SYMPTOMS
CHILLS: 0
FEVER: 0
COUGH: 0
SPUTUM PRODUCTION: 0
PND: 0
HEMOPTYSIS: 0
CLAUDICATION: 0
ORTHOPNEA: 0
WHEEZING: 0
PALPITATIONS: 0
SHORTNESS OF BREATH: 0

## 2025-05-08 NOTE — PROGRESS NOTES
This visit was conducted via Teams video call using secure and encrypted video conferencing technology.  The patient was in a private location (home) in the state of [place].    I am presently located in the clinic.  The patient's identity was confirmed and verbal consent was obtained for this virtual visit.   FOLLOW-UP [facility]  05/08/25     Pipo Lagos Sravan RUIZ ref for eval/mgmt of dyslipidemia, est 4/2023  Referral Source: Anna Salazar A.P.RN    Subjective     Interval hx/concerns: last seen 11/2024, had labs done.  Feeling well.   Tolerating meds.      DYSLIPIDEMIA  MED MGMT:  Current Rx:   Statin: rosuva 5mg   Non-Statin: None  Supplements: berberine, prior  stopped plant sterols/stanols   Side Effects? No  Adherence: Complete    LIFESTYLE MGMT:  Change in weight: Stable  Exercise habits: strenuous regular exercise program - very active 5/7 days/week   Dietary patterns: reduced red meat, no processed foods, yolks; home cooking  Etoh: No  Reported barriers to care: none     PERTINENT HLD PMHX:  History of ASCVD: None  Other Established Vascular Disease:  # SVT - stable, much improved since 2022, seeing cardiology, possibly having ablation     Age at Initial Diagnosis of Dyslipidemia: 47    Previously Attempted Interventions for Lipids - including outcome  Statin: None   Outcome: N/A  Non-Statin: None  Outcome: N/A  Any Previous History of Statin Intolerance? No  Baseline Lipids Prior to Treatment:   1/2023: total cholesterol 234, , HDL 41      PSVT:  mild recurrent episodes but only a few over several months.  Unclear etiology, mild symptomatic, no CP or syncope.  Needs propranolol refills - works well     HTN: no dx, meds     Dysglycemia: none     Antithromb tx : No, no hx of bleeding     Current Outpatient Medications on File Prior to Visit   Medication Sig Dispense Refill    VITAMIN D, CHOLECALCIFEROL, PO Take  by mouth.      rosuvastatin (CRESTOR) 5 MG Tab Take 1 Tablet by  mouth every day. To lower cholesterol and heart disease risk 100 Tablet 3    propranolol (INDERAL) 10 MG Tab Take 1 Tablet by mouth 2 times a day as needed (for SVT episode). 60 Tablet 2    CREATINE PO Take  by mouth.      Barberry-Oreg Grape-Goldenseal (BERBERINE COMPLEX) 200-200-50 MG Cap Take 500 mg by mouth 3 times a day.       No current facility-administered medications on file prior to visit.    No Known Allergies     Social History     Tobacco Use    Smoking status: Never    Smokeless tobacco: Never   Vaping Use    Vaping status: Never Used   Substance Use Topics    Alcohol use: Yes     Comment: socially monthly     Review of Systems   Constitutional:  Negative for chills, fever and malaise/fatigue.   Respiratory:  Negative for cough, hemoptysis, sputum production, shortness of breath and wheezing.    Cardiovascular:  Negative for chest pain, palpitations, orthopnea, claudication, leg swelling and PND.       Objective    There were no vitals filed for this visit.  BP Readings from Last 5 Encounters:   03/14/25 108/60   11/11/24 115/71   11/21/23 112/68   11/17/23 114/68   04/14/23 126/72     BMI Readings from Last 1 Encounters:   03/14/25 24.22 kg/m²      Wt Readings from Last 3 Encounters:   03/14/25 76.6 kg (168 lb 12.8 oz)   11/11/24 77.1 kg (169 lb 14.4 oz)   11/21/23 77.1 kg (170 lb)      Physical Exam  Constitutional:       General: He is not in acute distress.     Appearance: Normal appearance. He is well-developed. He is not diaphoretic.   HENT:      Head: Normocephalic.   Eyes:      Extraocular Movements: Extraocular movements intact.      Conjunctiva/sclera: Conjunctivae normal.   Pulmonary:      Effort: Pulmonary effort is normal. No respiratory distress.   Skin:     Coloration: Skin is not pale.      Findings: No rash.   Neurological:      Mental Status: He is alert and oriented to person, place, and time.      Cranial Nerves: No cranial nerve deficit.       DATA REVIEW:    [facility] 12/30/22  TC  "234  HDL 41    TG 67    [facility] 3/30/23      HDL 37  TG 62  hsCRP 0.71    Lab Results   Component Value Date    CHOLSTRLTOT 153 04/14/2025    TRIGLYCERIDE 46 04/14/2025    HDL 40 04/14/2025     (H) 04/14/2025     Lab Results   Component Value Date/Time     (H) 04/14/2025 02:51 PM        Latest Reference Range & Units 11/03/23 06:34 05/10/24 07:03 10/31/24 05:47   LDL Chol Calc (NIH) 0 - 99 mg/dL 146 (H) 138 (H) 129 (H)   VLDL Cholesterol Calc 5 - 40 mg/dL 16 12 12     Lab Results   Component Value Date/Time    LIPOPROTA <8.4 11/03/2023 06:34 AM      No results found for: \"APOB\"   Lab Results   Component Value Date/Time    CRPHIGHSEN <1 05/10/2024 07:03 AM       IMAGING    CACS 2/2023   Coronary calcification:  LMA - 0.0  LCX - 0.0  LAD - 81.5  RCA - 0.0  PDA - 0.0  Total Calcium Score: 81.5  Percentile: Calcium score is above the 75th percentile for the patient's age and sex.   Other findings:  Heart: Normal size.  Lungs: Clear.  Mediastinum: Normal.  Upper abdomen: Normal.      PROCEDURES:  NONE         ASSESSMENT AND PLAN  1. Primary hypercholesterolemia  APOLIPOPROTEIN B    Comp Metabolic Panel    Lipid Profile      2. Coronary artery calcification seen on CT scan  APOLIPOPROTEIN B    Comp Metabolic Panel    Lipid Profile      3. Agatston coronary artery calcium score less than 100        4. PSVT (paroxysmal supraventricular tachycardia) (HCC)          Patient Type: Primary Prevention, high risk due to CACS >75%ile for age/gender 2023    MAJOR ASCVD:  no    Other Established Vascular Disease:    1) subclinical CAD, evidenced by CACS = 81 (90%ile for age/gender)  Low short-term, higher long-term risk based upon %ile   Asymptomatic, subclinical CAD (evidenced by CACS = 81 (90%ile for age/gender) in 2023)  - no prior dx ASCVD events  - no LMA plaque noted   - no indications for functional testing w/o symptoms   - as reviewed with pt, ACC/AHA guidelines for chronic CAD mgmt (2023) " "support GDMT alone as initial mgmt citing multiple RCTs (ISCHEMIA, COURAGE, MELISSA 2D) demonstrating early revasc strategy plus GDMT did not improve MACE outcomes compared to GDMT alone  Plan:  - plan repeat CACS 2028   - continue lifestyle and med mgmt as noted below   - consider functional testing if new symptoms over time      2) SVT - stable, no sx     Evidence of genetic dyslipidemia: No , baseline LDL-C <160  FH genotyping:  not recommended    ACC/AHA Indication for Statin Therapy:  Primary Prevention - 40-76yo, LDLc >70, <190 w/o DM    Calculated Risk for ASCVD  PCE: The 10-year ASCVD risk score (Aleah JUNE, et al., 2019) is: 2.2%, <5% \"low risk\"    CACS 1-99 favors statin in male >55  FRANCIS risk score w/ CAC = 5.6 % (borderline, 3% absolute risk elevation)  FRANCIS risk score w/o CAC = 3 %  FRANCIS CAC percentile for age/gender = 90 %     PREVENT calc per 2024:  This individual has an estimated 10-year risk of CVD = 2.4%  This individual has an estimated 30-year risk of CVD = 16.4%    Other Significant Risk Markers  Lp(a) <8  apoB = 161   hsCRP 0.34   High CACS - see discussion above     Risk-enhancers: Persistently elevated LDL-C >159 - improved with TLC     Goal LDL-C and nonHDL-C based on Clinic Protocol  reduce LDL-C 30-49% and LDL-C <100  (or apoB <90)  At goal? Yes for LDL-C reduction, no for quantitative goal = 104 4/2025    - achieved 43% LDL-C reduction on current tx regimen,   - estimated 5-year RRR of CVD of 45% (CTT (Lancet, 2010) - 22% RRR per 38mg/dl LDL-C reduction)     LIFESTYLE INTERVENTIONS - PRIMARY FOCUS   TOBACCO:continued complete avoidance of all tobacco products     PHYSICAL ACTIVITY: >150min/week of mod-intensity activity or as much as tolerated    NUTRITION: Mediterranean and Plant-based - increase nuts, beans, whole grains, plant protein for animal 1-2 times/week     ETOH: limit to 2 or less standard drinks/day     WT MGMT: maintain healthy weight     LIPID LOWERING MEDICATION MANAGEMENT: "     Statin Therapy  - extensive review of borderline current risk and maximum of intermediate risk on 30-yr calculator  and changing risk profile based upon age  - aim is to reduce lifetime exposure to high LDL-C concentrations to reduce further plaque deposition over time   - Reviewed risks/benefits/alternatives for lipid-lowering therapies, and through shared decision-making we have opted to initiate statin therapy to lower ASCVD risk.     - continue rosuva 5mg daily     Non-Statin Medications: none     Indication for PCSK9 Inhibitor strategy: Not currently indicated    Supplements:   - continue berberine 500mg 2 times daily with food (preferred to reduce GI upset)      BLOOD PRESSURE MANAGEMENT:  Office BP goal per ACC/AHA <130/80   Home BP at goal: yes  Office BP at goal:  yes   Plan:   - continue healthy diet, activity, weight mgmt   - routine clinic-based BP measurements at least once annually   Medications: no meds indicated at this time      GLYCEMIC STATUS: Normal    ANTITHROMBOTIC THERAPY None    OTHER: none     STUDIES: repeat CACS 2028   LABS:  will check 6mo interval and contact with results  FOLLOW-UP:  12 months , sooner prrichard Fang M.D.  WILMER, Board-certified clinical lipidologist   [facility]   [facility]   [phone]